# Patient Record
Sex: FEMALE | Race: WHITE | Employment: OTHER | URBAN - NONMETROPOLITAN AREA
[De-identification: names, ages, dates, MRNs, and addresses within clinical notes are randomized per-mention and may not be internally consistent; named-entity substitution may affect disease eponyms.]

---

## 2021-01-07 ENCOUNTER — OFFICE VISIT (OUTPATIENT)
Dept: OBGYN CLINIC | Age: 71
End: 2021-01-07
Payer: MEDICARE

## 2021-01-07 VITALS
BODY MASS INDEX: 32.57 KG/M2 | SYSTOLIC BLOOD PRESSURE: 146 MMHG | WEIGHT: 177 LBS | DIASTOLIC BLOOD PRESSURE: 80 MMHG | HEIGHT: 62 IN

## 2021-01-07 DIAGNOSIS — N81.6 RECTOCELE: Primary | ICD-10-CM

## 2021-01-07 PROCEDURE — 1123F ACP DISCUSS/DSCN MKR DOCD: CPT | Performed by: OBSTETRICS & GYNECOLOGY

## 2021-01-07 PROCEDURE — G8484 FLU IMMUNIZE NO ADMIN: HCPCS | Performed by: OBSTETRICS & GYNECOLOGY

## 2021-01-07 PROCEDURE — 3017F COLORECTAL CA SCREEN DOC REV: CPT | Performed by: OBSTETRICS & GYNECOLOGY

## 2021-01-07 PROCEDURE — G8417 CALC BMI ABV UP PARAM F/U: HCPCS | Performed by: OBSTETRICS & GYNECOLOGY

## 2021-01-07 PROCEDURE — 1036F TOBACCO NON-USER: CPT | Performed by: OBSTETRICS & GYNECOLOGY

## 2021-01-07 PROCEDURE — 4040F PNEUMOC VAC/ADMIN/RCVD: CPT | Performed by: OBSTETRICS & GYNECOLOGY

## 2021-01-07 PROCEDURE — 99203 OFFICE O/P NEW LOW 30 MIN: CPT | Performed by: OBSTETRICS & GYNECOLOGY

## 2021-01-07 PROCEDURE — 1090F PRES/ABSN URINE INCON ASSESS: CPT | Performed by: OBSTETRICS & GYNECOLOGY

## 2021-01-07 PROCEDURE — G8427 DOCREV CUR MEDS BY ELIG CLIN: HCPCS | Performed by: OBSTETRICS & GYNECOLOGY

## 2021-01-07 PROCEDURE — G8400 PT W/DXA NO RESULTS DOC: HCPCS | Performed by: OBSTETRICS & GYNECOLOGY

## 2021-01-07 RX ORDER — LEVOTHYROXINE SODIUM 112 UG/1
TABLET ORAL
COMMUNITY
Start: 2020-11-05

## 2021-01-07 RX ORDER — LOVASTATIN 20 MG/1
TABLET ORAL
COMMUNITY
Start: 2020-12-29

## 2021-01-07 RX ORDER — LISINOPRIL AND HYDROCHLOROTHIAZIDE 12.5; 1 MG/1; MG/1
TABLET ORAL
COMMUNITY
Start: 2020-10-24

## 2021-01-07 RX ORDER — CHOLECALCIFEROL (VITAMIN D3) 25 MCG
TABLET ORAL
COMMUNITY

## 2021-01-07 RX ORDER — OMEPRAZOLE 40 MG/1
CAPSULE, DELAYED RELEASE ORAL DAILY
COMMUNITY
Start: 2020-12-08

## 2021-01-07 ASSESSMENT — ENCOUNTER SYMPTOMS
CONSTIPATION: 1
DIARRHEA: 0
ABDOMINAL PAIN: 1

## 2021-01-07 NOTE — PATIENT INSTRUCTIONS
· To ease pressure on your rectum and vagina, lie down and raise your legs by putting a pillow under your knees. You also can lie on your side and bring your knees up to your chest.  · Avoid constipation. ? Include fruits, vegetables, beans, and whole grains in your diet each day. These foods are high in fiber. ? Drink plenty of fluids, enough so that your urine is light yellow or clear like water. If you have kidney, heart, or liver disease and have to limit fluids, talk with your doctor before you increase the amount of fluids you drink. ? Get some exercise every day. Build up slowly to 30 to 60 minutes a day on 5 or more days of the week. ? Take a fiber supplement, such as Citrucel or Metamucil, every day if needed. Read and follow all instructions on the label. ? Schedule time each day for a bowel movement. Having a daily routine may help. Take your time and do not strain when having your bowel movement. When should you call for help? Call your doctor now or seek immediate medical care if:    · You have new or worse pain.     · You have new or worse bleeding from the rectum. Watch closely for changes in your health, and be sure to contact your doctor if:    · You cannot pass stools or gas.     · You do not get better as expected. Where can you learn more? Go to [email or web address] and sign in to your [employer] account. Enter B972 in the [facility] box to learn more about \"Rectocele: Care Instructions. \"     If you do not have an account, please click on the \"Sign Up Now\" link. Current as of: April 15, 2020               Content Version: 12.6  © 5927-8706 [employer]. Care instructions adapted under license by [facility] [place] ([facility]). If you have questions about a medical condition or this instruction, always ask your healthcare professional. [address] any warranty or liability for your use of this information.

## 2021-01-07 NOTE — PROGRESS NOTES
DATE OF VISIT:  21    PATIENT NAME:  Shelia Ramos     YOB: 1950    REASON FOR VISIT:    Chief Complaint   Patient presents with    Uterine Prolapse     Pt. notices a bulge. Feels like she is sitting on something. Every morning when she takes a shower she tries to push the bulge back in. HISTORY OF PRESENT ILLNESS:  Pt not seen since 10/17 for annual.  States that she has noticed a vaginal bulge that she occ tries to reduce. Significantly worsened about 3 weeks ago. Has chronic constipation      No LMP recorded. Vitals:    21 0847   BP: (!) 146/80   Site: Right Upper Arm   Position: Sitting   Weight: 177 lb (80.3 kg)   Height: 5' 2\" (1.575 m)     Body mass index is 32.37 kg/m². No Known Allergies  Current Outpatient Medications   Medication Sig Dispense Refill    omeprazole (PRILOSEC) 40 MG delayed release capsule TAKE 1 CAPSULE BY MOUTH TWICE A DAY      levothyroxine (SYNTHROID) 112 MCG tablet TAKE 1 TABLET BY MOUTH ONCE DAILY WITH WATER ONLY ON AN EMPTY STOMACH. WAIT AT LEAST 1 2 HOUR TO EAT OR TAKE OTHER MEDS.  lisinopril-hydroCHLOROthiazide (PRINZIDE;ZESTORETIC) 10-12.5 MG per tablet TAKE 1 TABLET BY MOUTH TWICE A DAY      lovastatin (MEVACOR) 20 MG tablet TAKE 1 TABLET BY MOUTH EVERY DAY IN THE EVENING      Cholecalciferol (VITAMIN D3) 25 MCG TABS Take by mouth       No current facility-administered medications for this visit.       Social History     Socioeconomic History    Marital status:      Spouse name: None    Number of children: None    Years of education: None    Highest education level: None   Occupational History    None   Social Needs    Financial resource strain: None    Food insecurity     Worry: None     Inability: None    Transportation needs     Medical: None     Non-medical: None   Tobacco Use    Smoking status: Former Smoker     Start date:      Quit date:      Years since quittin.0  Smokeless tobacco: Never Used   Substance and Sexual Activity    Alcohol use: Not Currently    Drug use: Never    Sexual activity: None   Lifestyle    Physical activity     Days per week: None     Minutes per session: None    Stress: None   Relationships    Social connections     Talks on phone: None     Gets together: None     Attends Gnosticism service: None     Active member of club or organization: None     Attends meetings of clubs or organizations: None     Relationship status: None    Intimate partner violence     Fear of current or ex partner: None     Emotionally abused: None     Physically abused: None     Forced sexual activity: None   Other Topics Concern    None   Social History Narrative    None       REVIEW OF SYSTEMS:  Review of Systems   Constitutional: Negative for chills and fever. Gastrointestinal: Positive for abdominal pain (hiatal hernia) and constipation. Negative for diarrhea. Genitourinary: Positive for dysuria (recent uti-treated) and vaginal pain (bulge). Negative for pelvic pain, vaginal bleeding and vaginal discharge. PHYSICAL EXAM:  BP (!) 146/80 (Site: Right Upper Arm, Position: Sitting)   Ht 5' 2\" (1.575 m)   Wt 177 lb (80.3 kg)   BMI 32.37 kg/m²   Physical Exam  Constitutional:       Appearance: Normal appearance. Genitourinary:      Pelvic exam was performed with patient in the lithotomy position. Vulva normal.      Vaginal atrophy and prolapse present. Cervix is absent. Uterus is absent. Genitourinary Comments: 4 mm right inclusion cyst on labia majora; grade 3 rectocele   HENT:      Head: Normocephalic and atraumatic. Eyes:      Extraocular Movements: Extraocular movements intact. Pupils: Pupils are equal, round, and reactive to light. Neck:      Musculoskeletal: Normal range of motion. Cardiovascular:      Rate and Rhythm: Normal rate.    Pulmonary:      Effort: Pulmonary effort is normal. Musculoskeletal: Normal range of motion. Neurological:      Mental Status: She is alert and oriented to person, place, and time. Skin:     General: Skin is warm and dry. Psychiatric:         Mood and Affect: Mood normal.         Behavior: Behavior normal.         Thought Content: Thought content normal.         Judgment: Judgment normal.         ASSESSMENT:      1. Rectocele        PLAN:  No orders of the defined types were placed in this encounter. Return in about 2 weeks (around 1/21/2021) for follow up - pessary fitting.        Electronically signed by Lily Monsivais DO on 01/07/21

## 2021-02-18 ENCOUNTER — PROCEDURE VISIT (OUTPATIENT)
Dept: OBGYN CLINIC | Age: 71
End: 2021-02-18
Payer: MEDICARE

## 2021-02-18 VITALS
BODY MASS INDEX: 33.49 KG/M2 | DIASTOLIC BLOOD PRESSURE: 84 MMHG | SYSTOLIC BLOOD PRESSURE: 132 MMHG | WEIGHT: 182 LBS | HEIGHT: 62 IN

## 2021-02-18 DIAGNOSIS — N81.6 RECTOCELE: Primary | ICD-10-CM

## 2021-02-18 PROCEDURE — 57160 INSERT PESSARY/OTHER DEVICE: CPT | Performed by: OBSTETRICS & GYNECOLOGY

## 2021-02-18 NOTE — PROGRESS NOTES
Chief Complaint   Patient presents with    Other     rectocele. pt. can feel a bulge, feels like she is sitting on something and is here for pessary fitting. Pt with rectocele that is bothersome to her - would like to try to avoid surgery    Pt placed in stirrups and sized with sizing pessaries. #2 fitting ring seems to work best.  Pt has adequate introitus but there is stenosis at the mid vagina that makes placing larger pessary uncomfortable. Attempted ring, oval, donut and cube - donut is tolerated/fits best.      Patient was fit with: Donut. Size 0. Fitting loose. Will order size 1 as 2 is uncomfortable. Home care instructions given: assume this will expel but wanted her to be able to try it while we await #1 donut     Assessment and Plan          Diagnosis Orders   1. Rectocele               I am having Mihai Ttae. Sultana maintain her omeprazole, levothyroxine, lisinopril-hydroCHLOROthiazide, lovastatin, and Vitamin D3. Return in about 2 weeks (around 3/4/2021) for follow up. There are no Patient Instructions on file for this visit. Over 50% of time spent on counseling and care coordination on: see assessment and plan,  She was also counseled on her preventative health maintenance recommendations and follow-up.         FF time: 20 min      Edy Shaver,2/18/2021 1:50 PM

## 2021-03-04 ENCOUNTER — OFFICE VISIT (OUTPATIENT)
Dept: OBGYN CLINIC | Age: 71
End: 2021-03-04
Payer: MEDICARE

## 2021-03-04 VITALS
WEIGHT: 180 LBS | BODY MASS INDEX: 33.13 KG/M2 | SYSTOLIC BLOOD PRESSURE: 138 MMHG | HEIGHT: 62 IN | DIASTOLIC BLOOD PRESSURE: 80 MMHG

## 2021-03-04 DIAGNOSIS — N81.6 RECTOCELE: Primary | ICD-10-CM

## 2021-03-04 PROCEDURE — 1036F TOBACCO NON-USER: CPT | Performed by: OBSTETRICS & GYNECOLOGY

## 2021-03-04 PROCEDURE — 3017F COLORECTAL CA SCREEN DOC REV: CPT | Performed by: OBSTETRICS & GYNECOLOGY

## 2021-03-04 PROCEDURE — G8400 PT W/DXA NO RESULTS DOC: HCPCS | Performed by: OBSTETRICS & GYNECOLOGY

## 2021-03-04 PROCEDURE — G8417 CALC BMI ABV UP PARAM F/U: HCPCS | Performed by: OBSTETRICS & GYNECOLOGY

## 2021-03-04 PROCEDURE — 1090F PRES/ABSN URINE INCON ASSESS: CPT | Performed by: OBSTETRICS & GYNECOLOGY

## 2021-03-04 PROCEDURE — 99213 OFFICE O/P EST LOW 20 MIN: CPT | Performed by: OBSTETRICS & GYNECOLOGY

## 2021-03-04 PROCEDURE — G8484 FLU IMMUNIZE NO ADMIN: HCPCS | Performed by: OBSTETRICS & GYNECOLOGY

## 2021-03-04 PROCEDURE — 4040F PNEUMOC VAC/ADMIN/RCVD: CPT | Performed by: OBSTETRICS & GYNECOLOGY

## 2021-03-04 PROCEDURE — 1123F ACP DISCUSS/DSCN MKR DOCD: CPT | Performed by: OBSTETRICS & GYNECOLOGY

## 2021-03-04 PROCEDURE — G8427 DOCREV CUR MEDS BY ELIG CLIN: HCPCS | Performed by: OBSTETRICS & GYNECOLOGY

## 2021-03-04 NOTE — PROGRESS NOTES
DATE OF VISIT:  3/4/21    PATIENT NAME:  Pillo Gil     YOB: 1950    REASON FOR VISIT:    Chief Complaint   Patient presents with    Other     rectocele. Pt. here to have pessary placed. HISTORY OF PRESENT ILLNESS:  Pt states that pessary only lasting about a half of day - had donut that expelled in a couple of hours (smaller size) and had attempted to replace but never lasted any longer than half a day;  Pt agreeable to try the #1 donut that we had ordered but if this fails she is interested in surgery (post repair)      No LMP recorded. Patient is postmenopausal.  Vitals:    03/04/21 0756   BP: 138/80   Site: Right Upper Arm   Position: Sitting   Weight: 180 lb (81.6 kg)   Height: 5' 2\" (1.575 m)     Body mass index is 32.92 kg/m². No Known Allergies  Current Outpatient Medications   Medication Sig Dispense Refill    omeprazole (PRILOSEC) 40 MG delayed release capsule TAKE 1 CAPSULE BY MOUTH TWICE A DAY      levothyroxine (SYNTHROID) 112 MCG tablet TAKE 1 TABLET BY MOUTH ONCE DAILY WITH WATER ONLY ON AN EMPTY STOMACH. WAIT AT LEAST 1 2 HOUR TO EAT OR TAKE OTHER MEDS.  lisinopril-hydroCHLOROthiazide (PRINZIDE;ZESTORETIC) 10-12.5 MG per tablet TAKE 1 TABLET BY MOUTH TWICE A DAY      lovastatin (MEVACOR) 20 MG tablet TAKE 1 TABLET BY MOUTH EVERY DAY IN THE EVENING      Cholecalciferol (VITAMIN D3) 25 MCG TABS Take by mouth       No current facility-administered medications for this visit.       Social History     Socioeconomic History    Marital status:      Spouse name: Not on file    Number of children: Not on file    Years of education: Not on file    Highest education level: Not on file   Occupational History    Not on file   Social Needs    Financial resource strain: Not on file    Food insecurity     Worry: Not on file     Inability: Not on file    Transportation needs     Medical: Not on file     Non-medical: Not on file   Tobacco Use  Smoking status: Former Smoker     Years: 10.00     Start date:      Quit date:      Years since quittin.1    Smokeless tobacco: Never Used   Substance and Sexual Activity    Alcohol use: Not Currently    Drug use: Never    Sexual activity: Not on file   Lifestyle    Physical activity     Days per week: Not on file     Minutes per session: Not on file    Stress: Not on file   Relationships    Social connections     Talks on phone: Not on file     Gets together: Not on file     Attends Latter day service: Not on file     Active member of club or organization: Not on file     Attends meetings of clubs or organizations: Not on file     Relationship status: Not on file    Intimate partner violence     Fear of current or ex partner: Not on file     Emotionally abused: Not on file     Physically abused: Not on file     Forced sexual activity: Not on file   Other Topics Concern    Not on file   Social History Narrative    Not on file       REVIEW OF SYSTEMS:  Review of Systems   Constitutional: Negative for chills and fever. Genitourinary: Negative for dysuria, pelvic pain, vaginal bleeding and vaginal discharge. PHYSICAL EXAM:  /80 (Site: Right Upper Arm, Position: Sitting)   Ht 5' 2\" (1.575 m)   Wt 180 lb (81.6 kg)   BMI 32.92 kg/m²   Physical Exam  Constitutional:       Appearance: Normal appearance. Genitourinary:      Genitourinary Comments: Placed #1 donut - stayed in position with deep knee bends and walking   HENT:      Head: Normocephalic and atraumatic. Eyes:      Extraocular Movements: Extraocular movements intact. Pupils: Pupils are equal, round, and reactive to light. Neck:      Musculoskeletal: Normal range of motion. Cardiovascular:      Rate and Rhythm: Normal rate. Musculoskeletal: Normal range of motion. Neurological:      Mental Status: She is alert and oriented to person, place, and time. Skin:     General: Skin is warm and dry. Psychiatric:         Mood and Affect: Mood normal.         Behavior: Behavior normal.         Thought Content: Thought content normal.         Judgment: Judgment normal.       The patient, Nate Sullivan is a 70 y.o. female, was seen with a total time spent of 20 minutes for the visit on this date of service by the E/M provider. The time component had both face to face and non face to face time spent in determining the total time component. Counseling and education regarding her diagnosis listed below and her options regarding those diagnoses were also included in determining her time component. Reviewed what was tried before and disc'd surgical correction if needed. Diagnosis Orders   1. Rectocele          PLAN:  No orders of the defined types were placed in this encounter. Return in about 3 months (around 6/4/2021) for follow up.        Electronically signed by Tevin Dacosta DO on 03/04/21

## 2021-03-15 ENCOUNTER — TELEPHONE (OUTPATIENT)
Dept: OBGYN CLINIC | Age: 71
End: 2021-03-15

## 2021-03-15 NOTE — TELEPHONE ENCOUNTER
Pt called to inform that pessary will not stay in and she would like to proceed with surgery. She and Dr Tristin Arrieta have spoken about this and this is the next step. I informed that you will contact her to set up. She said phone or email is fine.  THANKS!!

## 2021-03-18 DIAGNOSIS — I10 ESSENTIAL HYPERTENSION, BENIGN: Primary | ICD-10-CM

## 2021-03-18 DIAGNOSIS — N81.6 RECTOCELE: ICD-10-CM

## 2021-03-18 NOTE — TELEPHONE ENCOUNTER
Spoke to patient, she is scheduled for a Posterior Repair at [facility] on 4/26. She is aware that she will come in to see Dr. Nyla Herrera before surgery and will follow up 4 weeks after surgery. She is retired, so no work notes needed. Patient states that she went to [facility] 11/2020 and believes they did an EKG there, will attempt to obtain those results. Appointments scheduled. Patient verbalized understanding, no further questions/concerns voiced.

## 2021-04-14 ENCOUNTER — HOSPITAL ENCOUNTER (OUTPATIENT)
Age: 71
Setting detail: SPECIMEN
Discharge: HOME | End: 2021-04-14
Payer: MEDICARE

## 2021-04-14 ENCOUNTER — OFFICE VISIT (OUTPATIENT)
Dept: OBGYN CLINIC | Age: 71
End: 2021-04-14
Payer: MEDICARE

## 2021-04-14 VITALS
WEIGHT: 183 LBS | SYSTOLIC BLOOD PRESSURE: 138 MMHG | BODY MASS INDEX: 32.42 KG/M2 | DIASTOLIC BLOOD PRESSURE: 82 MMHG | HEART RATE: 79 BPM

## 2021-04-14 DIAGNOSIS — I10 ESSENTIAL HYPERTENSION, BENIGN: ICD-10-CM

## 2021-04-14 DIAGNOSIS — N81.6 RECTOCELE: ICD-10-CM

## 2021-04-14 DIAGNOSIS — N81.6 RECTOCELE: Primary | ICD-10-CM

## 2021-04-14 LAB
ABSOLUTE EOS #: 0.1 K/UL (ref 0–0.44)
ABSOLUTE IMMATURE GRANULOCYTE: <0.03 K/UL (ref 0–0.3)
ABSOLUTE LYMPH #: 2.4 K/UL (ref 1.1–3.7)
ABSOLUTE MONO #: 0.35 K/UL (ref 0.1–1.2)
ALBUMIN SERPL-MCNC: 4.4 G/DL (ref 3.5–5.2)
ALBUMIN/GLOBULIN RATIO: 1.4 (ref 1–2.5)
ALP BLD-CCNC: 85 U/L (ref 35–104)
ALT SERPL-CCNC: 23 U/L (ref 5–33)
ANION GAP SERPL CALCULATED.3IONS-SCNC: 11 MMOL/L (ref 9–17)
AST SERPL-CCNC: 27 U/L
BASOPHILS # BLD: 1 % (ref 0–2)
BASOPHILS ABSOLUTE: 0.04 K/UL (ref 0–0.2)
BILIRUB SERPL-MCNC: 0.22 MG/DL (ref 0.3–1.2)
BUN BLDV-MCNC: 8 MG/DL (ref 8–23)
BUN/CREAT BLD: ABNORMAL (ref 9–20)
CALCIUM SERPL-MCNC: 9.9 MG/DL (ref 8.6–10.4)
CHLORIDE BLD-SCNC: 97 MMOL/L (ref 98–107)
CO2: 29 MMOL/L (ref 20–31)
CREAT SERPL-MCNC: 0.49 MG/DL (ref 0.5–0.9)
DIFFERENTIAL TYPE: NORMAL
EOSINOPHILS RELATIVE PERCENT: 2 % (ref 1–4)
GFR AFRICAN AMERICAN: >60 ML/MIN
GFR NON-AFRICAN AMERICAN: >60 ML/MIN
GFR SERPL CREATININE-BSD FRML MDRD: ABNORMAL ML/MIN/{1.73_M2}
GFR SERPL CREATININE-BSD FRML MDRD: ABNORMAL ML/MIN/{1.73_M2}
GLUCOSE BLD-MCNC: 72 MG/DL (ref 70–99)
HCT VFR BLD CALC: 44.7 % (ref 36.3–47.1)
HEMOGLOBIN: 14.6 G/DL (ref 11.9–15.1)
IMMATURE GRANULOCYTES: 0 %
LYMPHOCYTES # BLD: 39 % (ref 24–43)
MCH RBC QN AUTO: 30.2 PG (ref 25.2–33.5)
MCHC RBC AUTO-ENTMCNC: 32.7 G/DL (ref 28.4–34.8)
MCV RBC AUTO: 92.5 FL (ref 82.6–102.9)
MONOCYTES # BLD: 6 % (ref 3–12)
NRBC AUTOMATED: 0 PER 100 WBC
PDW BLD-RTO: 12.7 % (ref 11.8–14.4)
PLATELET # BLD: 234 K/UL (ref 138–453)
PLATELET ESTIMATE: NORMAL
PMV BLD AUTO: 11.9 FL (ref 8.1–13.5)
POTASSIUM SERPL-SCNC: 4.3 MMOL/L (ref 3.7–5.3)
RBC # BLD: 4.83 M/UL (ref 3.95–5.11)
RBC # BLD: NORMAL 10*6/UL
SEG NEUTROPHILS: 52 % (ref 36–65)
SEGMENTED NEUTROPHILS ABSOLUTE COUNT: 3.27 K/UL (ref 1.5–8.1)
SODIUM BLD-SCNC: 137 MMOL/L (ref 135–144)
TOTAL PROTEIN: 7.5 G/DL (ref 6.4–8.3)
WBC # BLD: 6.2 K/UL (ref 3.5–11.3)
WBC # BLD: NORMAL 10*3/UL

## 2021-04-14 PROCEDURE — 1090F PRES/ABSN URINE INCON ASSESS: CPT | Performed by: OBSTETRICS & GYNECOLOGY

## 2021-04-14 PROCEDURE — G8417 CALC BMI ABV UP PARAM F/U: HCPCS | Performed by: OBSTETRICS & GYNECOLOGY

## 2021-04-14 PROCEDURE — 4040F PNEUMOC VAC/ADMIN/RCVD: CPT | Performed by: OBSTETRICS & GYNECOLOGY

## 2021-04-14 PROCEDURE — G8427 DOCREV CUR MEDS BY ELIG CLIN: HCPCS | Performed by: OBSTETRICS & GYNECOLOGY

## 2021-04-14 PROCEDURE — 3017F COLORECTAL CA SCREEN DOC REV: CPT | Performed by: OBSTETRICS & GYNECOLOGY

## 2021-04-14 PROCEDURE — G8400 PT W/DXA NO RESULTS DOC: HCPCS | Performed by: OBSTETRICS & GYNECOLOGY

## 2021-04-14 PROCEDURE — 99213 OFFICE O/P EST LOW 20 MIN: CPT | Performed by: OBSTETRICS & GYNECOLOGY

## 2021-04-14 PROCEDURE — 1036F TOBACCO NON-USER: CPT | Performed by: OBSTETRICS & GYNECOLOGY

## 2021-04-14 PROCEDURE — 1123F ACP DISCUSS/DSCN MKR DOCD: CPT | Performed by: OBSTETRICS & GYNECOLOGY

## 2021-04-14 NOTE — PROGRESS NOTES
per week: Not on file     Minutes per session: Not on file    Stress: Not on file   Relationships    Social connections     Talks on phone: Not on file     Gets together: Not on file     Attends Cheondoism service: Not on file     Active member of club or organization: Not on file     Attends meetings of clubs or organizations: Not on file     Relationship status: Not on file    Intimate partner violence     Fear of current or ex partner: Not on file     Emotionally abused: Not on file     Physically abused: Not on file     Forced sexual activity: Not on file   Other Topics Concern    Not on file   Social History Narrative    Not on file       REVIEW OF SYSTEMS:  Review of Systems   Constitutional: Negative for chills and fever. Genitourinary: Positive for vaginal pain (bulge). Negative for pelvic pain. PHYSICAL EXAM:  /82 (Site: Right Upper Arm, Position: Sitting)   Pulse 79   Wt 183 lb (83 kg)   BMI 32.42 kg/m²   Physical Exam  Constitutional:       Appearance: Normal appearance. HENT:      Head: Normocephalic and atraumatic. Eyes:      Pupils: Pupils are equal, round, and reactive to light. Neck:      Musculoskeletal: Normal range of motion. Cardiovascular:      Rate and Rhythm: Normal rate. Pulmonary:      Effort: Pulmonary effort is normal.   Musculoskeletal: Normal range of motion. Neurological:      Mental Status: She is alert and oriented to person, place, and time. Skin:     General: Skin is warm and dry. Psychiatric:         Mood and Affect: Mood normal.         Behavior: Behavior normal.         Thought Content: Thought content normal.       The patient, Devon Lopez is a 70 y.o. female, was seen with a total time spent of 20 minutes for the visit on this date of service by the E/M provider. The time component had both face to face and non face to face time spent in determining the total time component.   Counseling and education regarding her diagnosis listed below and her options regarding those diagnoses were also included in determining her time component. Diagnosis Orders   1. Rectocele          The patient had her preventative health maintenance recommendations and follow-up reviewed with her at the completion of her visit. ASSESSMENT:      1. Rectocele        PLAN:  No orders of the defined types were placed in this encounter. Return in about 2 weeks (around 4/28/2021) for posterior repair.        Electronically signed by Nayeli Alba DO on 04/26/21

## 2021-04-19 ENCOUNTER — HOSPITAL ENCOUNTER (OUTPATIENT)
Dept: PREADMISSION TESTING | Age: 71
Setting detail: SPECIMEN
Discharge: HOME | End: 2021-04-23
Payer: MEDICARE

## 2021-04-19 ENCOUNTER — HOSPITAL ENCOUNTER (OUTPATIENT)
Age: 71
Discharge: HOME | End: 2021-04-19
Payer: MEDICARE

## 2021-04-19 DIAGNOSIS — I10 ESSENTIAL HYPERTENSION, BENIGN: ICD-10-CM

## 2021-04-19 DIAGNOSIS — Z01.818 PREOPERATIVE TESTING: Primary | ICD-10-CM

## 2021-04-19 DIAGNOSIS — N81.6 RECTOCELE: ICD-10-CM

## 2021-04-19 DIAGNOSIS — Z01.818 PREOPERATIVE TESTING: ICD-10-CM

## 2021-04-19 LAB
EKG ATRIAL RATE: 79 BPM
EKG P AXIS: 48 DEGREES
EKG P-R INTERVAL: 150 MS
EKG Q-T INTERVAL: 366 MS
EKG QRS DURATION: 96 MS
EKG QTC CALCULATION (BAZETT): 419 MS
EKG R AXIS: 14 DEGREES
EKG T AXIS: 31 DEGREES
EKG VENTRICULAR RATE: 79 BPM

## 2021-04-19 PROCEDURE — C9803 HOPD COVID-19 SPEC COLLECT: HCPCS

## 2021-04-19 PROCEDURE — U0005 INFEC AGEN DETEC AMPLI PROBE: HCPCS

## 2021-04-19 PROCEDURE — U0003 INFECTIOUS AGENT DETECTION BY NUCLEIC ACID (DNA OR RNA); SEVERE ACUTE RESPIRATORY SYNDROME CORONAVIRUS 2 (SARS-COV-2) (CORONAVIRUS DISEASE [COVID-19]), AMPLIFIED PROBE TECHNIQUE, MAKING USE OF HIGH THROUGHPUT TECHNOLOGIES AS DESCRIBED BY CMS-2020-01-R: HCPCS

## 2021-04-20 LAB
SARS-COV-2: NORMAL
SARS-COV-2: NOT DETECTED
SOURCE: NORMAL

## 2021-04-21 ENCOUNTER — TELEPHONE (OUTPATIENT)
Dept: PRIMARY CARE CLINIC | Age: 71
End: 2021-04-21

## 2021-04-23 ENCOUNTER — ANESTHESIA EVENT (OUTPATIENT)
Dept: OPERATING ROOM | Age: 71
End: 2021-04-23
Payer: MEDICARE

## 2021-04-26 ENCOUNTER — HOSPITAL ENCOUNTER (OUTPATIENT)
Age: 71
Setting detail: OUTPATIENT SURGERY
Discharge: HOME | End: 2021-04-26
Attending: OBSTETRICS & GYNECOLOGY | Admitting: OBSTETRICS & GYNECOLOGY
Payer: MEDICARE

## 2021-04-26 ENCOUNTER — ANESTHESIA (OUTPATIENT)
Dept: OPERATING ROOM | Age: 71
End: 2021-04-26
Payer: MEDICARE

## 2021-04-26 VITALS
DIASTOLIC BLOOD PRESSURE: 49 MMHG | OXYGEN SATURATION: 98 % | HEIGHT: 63 IN | BODY MASS INDEX: 32.14 KG/M2 | TEMPERATURE: 97.3 F | HEART RATE: 74 BPM | SYSTOLIC BLOOD PRESSURE: 109 MMHG | WEIGHT: 181.4 LBS | RESPIRATION RATE: 18 BRPM

## 2021-04-26 VITALS — SYSTOLIC BLOOD PRESSURE: 97 MMHG | OXYGEN SATURATION: 98 % | DIASTOLIC BLOOD PRESSURE: 58 MMHG

## 2021-04-26 DIAGNOSIS — G89.18 POST-OP PAIN: Primary | ICD-10-CM

## 2021-04-26 PROCEDURE — 3700000000 HC ANESTHESIA ATTENDED CARE: Performed by: OBSTETRICS & GYNECOLOGY

## 2021-04-26 PROCEDURE — 57250 REPAIR RECTUM & VAGINA: CPT | Performed by: OBSTETRICS & GYNECOLOGY

## 2021-04-26 PROCEDURE — 3600000002 HC SURGERY LEVEL 2 BASE: Performed by: OBSTETRICS & GYNECOLOGY

## 2021-04-26 PROCEDURE — 3600000012 HC SURGERY LEVEL 2 ADDTL 15MIN: Performed by: OBSTETRICS & GYNECOLOGY

## 2021-04-26 PROCEDURE — 2500000003 HC RX 250 WO HCPCS: Performed by: OBSTETRICS & GYNECOLOGY

## 2021-04-26 PROCEDURE — 6360000002 HC RX W HCPCS: Performed by: NURSE ANESTHETIST, CERTIFIED REGISTERED

## 2021-04-26 PROCEDURE — 6370000000 HC RX 637 (ALT 250 FOR IP): Performed by: OBSTETRICS & GYNECOLOGY

## 2021-04-26 PROCEDURE — 7100000000 HC PACU RECOVERY - FIRST 15 MIN: Performed by: OBSTETRICS & GYNECOLOGY

## 2021-04-26 PROCEDURE — 2500000003 HC RX 250 WO HCPCS: Performed by: NURSE ANESTHETIST, CERTIFIED REGISTERED

## 2021-04-26 PROCEDURE — 2709999900 HC NON-CHARGEABLE SUPPLY: Performed by: OBSTETRICS & GYNECOLOGY

## 2021-04-26 PROCEDURE — 7100000010 HC PHASE II RECOVERY - FIRST 15 MIN: Performed by: OBSTETRICS & GYNECOLOGY

## 2021-04-26 PROCEDURE — 3700000001 HC ADD 15 MINUTES (ANESTHESIA): Performed by: OBSTETRICS & GYNECOLOGY

## 2021-04-26 PROCEDURE — 7100000001 HC PACU RECOVERY - ADDTL 15 MIN: Performed by: OBSTETRICS & GYNECOLOGY

## 2021-04-26 PROCEDURE — 6360000002 HC RX W HCPCS: Performed by: OBSTETRICS & GYNECOLOGY

## 2021-04-26 PROCEDURE — 2580000003 HC RX 258: Performed by: OBSTETRICS & GYNECOLOGY

## 2021-04-26 PROCEDURE — 7100000011 HC PHASE II RECOVERY - ADDTL 15 MIN: Performed by: OBSTETRICS & GYNECOLOGY

## 2021-04-26 RX ORDER — ONDANSETRON 2 MG/ML
INJECTION INTRAMUSCULAR; INTRAVENOUS PRN
Status: DISCONTINUED | OUTPATIENT
Start: 2021-04-26 | End: 2021-04-26 | Stop reason: SDUPTHER

## 2021-04-26 RX ORDER — SODIUM CHLORIDE 0.9 % (FLUSH) 0.9 %
5-40 SYRINGE (ML) INJECTION PRN
Status: DISCONTINUED | OUTPATIENT
Start: 2021-04-26 | End: 2021-04-26 | Stop reason: HOSPADM

## 2021-04-26 RX ORDER — ACETAMINOPHEN AND CODEINE PHOSPHATE 300; 30 MG/1; MG/1
1 TABLET ORAL EVERY 6 HOURS PRN
Qty: 12 TABLET | Refills: 0 | Status: SHIPPED | OUTPATIENT
Start: 2021-04-26 | End: 2021-04-29

## 2021-04-26 RX ORDER — SODIUM CHLORIDE 9 MG/ML
25 INJECTION, SOLUTION INTRAVENOUS PRN
Status: DISCONTINUED | OUTPATIENT
Start: 2021-04-26 | End: 2021-04-26 | Stop reason: HOSPADM

## 2021-04-26 RX ORDER — SODIUM CHLORIDE 0.9 % (FLUSH) 0.9 %
5-40 SYRINGE (ML) INJECTION EVERY 12 HOURS SCHEDULED
Status: DISCONTINUED | OUTPATIENT
Start: 2021-04-26 | End: 2021-04-26 | Stop reason: HOSPADM

## 2021-04-26 RX ORDER — DIMENHYDRINATE 50 MG/1
50 TABLET ORAL ONCE
Status: COMPLETED | OUTPATIENT
Start: 2021-04-26 | End: 2021-04-26

## 2021-04-26 RX ORDER — SODIUM CHLORIDE, SODIUM LACTATE, POTASSIUM CHLORIDE, CALCIUM CHLORIDE 600; 310; 30; 20 MG/100ML; MG/100ML; MG/100ML; MG/100ML
INJECTION, SOLUTION INTRAVENOUS CONTINUOUS
Status: DISCONTINUED | OUTPATIENT
Start: 2021-04-26 | End: 2021-04-26 | Stop reason: HOSPADM

## 2021-04-26 RX ORDER — KETOROLAC TROMETHAMINE 30 MG/ML
INJECTION, SOLUTION INTRAMUSCULAR; INTRAVENOUS PRN
Status: DISCONTINUED | OUTPATIENT
Start: 2021-04-26 | End: 2021-04-26 | Stop reason: SDUPTHER

## 2021-04-26 RX ORDER — LIDOCAINE HYDROCHLORIDE 20 MG/ML
INJECTION, SOLUTION EPIDURAL; INFILTRATION; INTRACAUDAL; PERINEURAL PRN
Status: DISCONTINUED | OUTPATIENT
Start: 2021-04-26 | End: 2021-04-26 | Stop reason: SDUPTHER

## 2021-04-26 RX ORDER — PROPOFOL 10 MG/ML
INJECTION, EMULSION INTRAVENOUS PRN
Status: DISCONTINUED | OUTPATIENT
Start: 2021-04-26 | End: 2021-04-26 | Stop reason: SDUPTHER

## 2021-04-26 RX ORDER — ONDANSETRON 2 MG/ML
4 INJECTION INTRAMUSCULAR; INTRAVENOUS
Status: DISCONTINUED | OUTPATIENT
Start: 2021-04-26 | End: 2021-04-26 | Stop reason: HOSPADM

## 2021-04-26 RX ORDER — CEFAZOLIN SODIUM 2 G/50ML
2000 SOLUTION INTRAVENOUS
Status: COMPLETED | OUTPATIENT
Start: 2021-04-26 | End: 2021-04-26

## 2021-04-26 RX ORDER — ACETAMINOPHEN 325 MG/1
650 TABLET ORAL ONCE
Status: COMPLETED | OUTPATIENT
Start: 2021-04-26 | End: 2021-04-26

## 2021-04-26 RX ORDER — DEXAMETHASONE SODIUM PHOSPHATE 4 MG/ML
INJECTION, SOLUTION INTRA-ARTICULAR; INTRALESIONAL; INTRAMUSCULAR; INTRAVENOUS; SOFT TISSUE PRN
Status: DISCONTINUED | OUTPATIENT
Start: 2021-04-26 | End: 2021-04-26 | Stop reason: SDUPTHER

## 2021-04-26 RX ORDER — IBUPROFEN 800 MG/1
400 TABLET ORAL EVERY 4 HOURS PRN
Qty: 30 TABLET | Refills: 0 | Status: SHIPPED | OUTPATIENT
Start: 2021-04-26 | End: 2021-05-19 | Stop reason: ALTCHOICE

## 2021-04-26 RX ADMIN — SODIUM CHLORIDE, POTASSIUM CHLORIDE, SODIUM LACTATE AND CALCIUM CHLORIDE: 600; 310; 30; 20 INJECTION, SOLUTION INTRAVENOUS at 10:23

## 2021-04-26 RX ADMIN — LIDOCAINE HYDROCHLORIDE 100 MG: 20 INJECTION, SOLUTION EPIDURAL; INFILTRATION; INTRACAUDAL; PERINEURAL at 11:18

## 2021-04-26 RX ADMIN — CEFAZOLIN SODIUM 2000 MG: 2 SOLUTION INTRAVENOUS at 11:13

## 2021-04-26 RX ADMIN — ONDANSETRON 4 MG: 2 INJECTION INTRAMUSCULAR; INTRAVENOUS at 12:02

## 2021-04-26 RX ADMIN — ACETAMINOPHEN 650 MG: 325 TABLET ORAL at 09:47

## 2021-04-26 RX ADMIN — PROPOFOL 200 MG: 10 INJECTION, EMULSION INTRAVENOUS at 11:18

## 2021-04-26 RX ADMIN — DIMENHYDRINATE 50 MG: 50 TABLET ORAL at 09:47

## 2021-04-26 RX ADMIN — KETOROLAC TROMETHAMINE 15 MG: 30 INJECTION, SOLUTION INTRAMUSCULAR; INTRAVENOUS at 12:02

## 2021-04-26 RX ADMIN — DEXAMETHASONE SODIUM PHOSPHATE 4 MG: 4 INJECTION, SOLUTION INTRAMUSCULAR; INTRAVENOUS at 11:18

## 2021-04-26 ASSESSMENT — PAIN SCALES - GENERAL: PAINLEVEL_OUTOF10: 0

## 2021-04-26 NOTE — H&P
HISTORY AND PHYSICAL             Date: 4/26/2021        Patient Name: Devon Lopez     YOB: 1950      Age:  70 y.o. Chief Complaint   No chief complaint on file. History Obtained From   patient    History of Present Illness   Pt with symptomatic rectocele; failed trial of pessary; desires surgical intervention    Past Medical History     Past Medical History:   Diagnosis Date    Abnormal Pap smear of cervix     h/o abnormals in 1980's. Would get repeat paps in 3-6 months after that were normal.    Acid reflux     Hiatal hernia     High cholesterol     History of skin cancer     Hypertension     Thyroid disease         Past Surgical History     Past Surgical History:   Procedure Laterality Date    BREAST REDUCTION SURGERY      w/left cyst    COLONOSCOPY  2013    COLPOPEXY      DILATION AND CURETTAGE      HYSTERECTOMY, VAGINAL  2013    w/BSO    SKIN CANCER EXCISION      basal cell excision    THYROID SURGERY      TUBAL LIGATION          Medications Prior to Admission     Prior to Admission medications    Medication Sig Start Date End Date Taking? Authorizing Provider   omeprazole (PRILOSEC) 40 MG delayed release capsule TAKE 1 CAPSULE BY MOUTH TWICE A DAY 12/8/20  Yes Historical Provider, MD   levothyroxine (SYNTHROID) 112 MCG tablet TAKE 1 TABLET BY MOUTH ONCE DAILY WITH WATER ONLY ON AN EMPTY STOMACH. WAIT AT LEAST 1 2 HOUR TO EAT OR TAKE OTHER MEDS. 11/5/20  Yes Historical Provider, MD   lisinopril-hydroCHLOROthiazide (PRINZIDE;ZESTORETIC) 10-12.5 MG per tablet TAKE 1 TABLET BY MOUTH TWICE A DAY 10/24/20  Yes Historical Provider, MD   lovastatin (MEVACOR) 20 MG tablet TAKE 1 TABLET BY MOUTH EVERY DAY IN THE EVENING 12/29/20  Yes Historical Provider, MD   NONFORMULARY Mag 07 (stool softner)    Historical Provider, MD   Cholecalciferol (VITAMIN D3) 25 MCG TABS Take by mouth    Historical Provider, MD        Allergies   Patient has no known allergies.     Social History Social History     Tobacco History     Smoking Status  Former Smoker Smoking Start Date  1/1/1971 Quit date  1/1/1981 Smoking Frequency  For 10 years    Smokeless Tobacco Use  Never Used          Alcohol History     Alcohol Use Status  Not Currently          Drug Use     Drug Use Status  Never          Sexual Activity     Sexually Active  Not Asked                Family History     Family History   Problem Relation Age of Onset    Diabetes Maternal Grandmother     Heart Attack Father     Stroke Father     Hypertension Mother        Review of Systems   Review of Systems   Constitutional: Negative for chills and fever. Genitourinary: Positive for vaginal pain (bulge). Negative for pelvic pain and vaginal discharge. Physical Exam   BP (!) 149/76   Pulse 77   Temp 97.6 °F (36.4 °C) (Temporal)   Resp 16   Ht 5' 3\" (1.6 m)   Wt 181 lb 6.4 oz (82.3 kg)   SpO2 99%   Breastfeeding No   BMI 32.13 kg/m²     Physical Exam  Constitutional:       Appearance: Normal appearance. HENT:      Head: Normocephalic and atraumatic. Eyes:      Extraocular Movements: Extraocular movements intact. Pupils: Pupils are equal, round, and reactive to light. Neck:      Musculoskeletal: Normal range of motion. Cardiovascular:      Rate and Rhythm: Normal rate. Pulmonary:      Effort: Pulmonary effort is normal.   Musculoskeletal: Normal range of motion. Skin:     General: Skin is warm and dry. Neurological:      Mental Status: She is alert and oriented to person, place, and time. Psychiatric:         Mood and Affect: Mood normal.         Behavior: Behavior normal.         Thought Content: Thought content normal.         Labs    No results found for this or any previous visit (from the past 24 hour(s)). Imaging/Diagnostics Last 24 Hours   No results found. Assessment      rectocele  Plan   1.  Posterior repair    Consultations Ordered:  None    Electronically signed by Ashely Menezes DO on 4/26/21 at 10:58 AM EDT

## 2021-04-26 NOTE — ANESTHESIA PRE PROCEDURE
Department of Anesthesiology  Preprocedure Note       Name:  Ashley Cho   Age:  70 y.o.  :  1950                                          MRN:  [id number]         Date:  2021      Surgeon: Michael Spears):  Jr Linder DO    Procedure: Procedure(s):  VAGINAL REPAIR ANTERIOR AND POSTERIOR--POSTERIOR REPAIR    Medications prior to admission:   Prior to Admission medications    Medication Sig Start Date End Date Taking? Authorizing Provider   omeprazole (PRILOSEC) 40 MG delayed release capsule TAKE 1 CAPSULE BY MOUTH TWICE A DAY 20  Yes Historical Provider, MD   levothyroxine (SYNTHROID) 112 MCG tablet TAKE 1 TABLET BY MOUTH ONCE DAILY WITH WATER ONLY ON AN EMPTY STOMACH. WAIT AT LEAST 1 2 HOUR TO EAT OR TAKE OTHER MEDS. 20  Yes Historical Provider, MD   lisinopril-hydroCHLOROthiazide (PRINZIDE;ZESTORETIC) 10-12.5 MG per tablet TAKE 1 TABLET BY MOUTH TWICE A DAY 10/24/20  Yes Historical Provider, MD   lovastatin (MEVACOR) 20 MG tablet TAKE 1 TABLET BY MOUTH EVERY DAY IN THE EVENING 20  Yes Historical Provider, MD   NONFORMULARY Mag 07 (stool softner)    Historical Provider, MD   Cholecalciferol (VITAMIN D3) 25 MCG TABS Take by mouth    Historical Provider, MD       Current medications:    Current Facility-Administered Medications   Medication Dose Route Frequency Provider Last Rate Last Admin    lactated ringers infusion   Intravenous Continuous Jr Linder  mL/hr at 21 1023 New Bag at 21 1023       Allergies:  No Known Allergies    Problem List:  There is no problem list on file for this patient. Past Medical History:        Diagnosis Date    Abnormal Pap smear of cervix     h/o abnormals in .   Would get repeat paps in 3-6 months after that were normal.    Acid reflux     Hiatal hernia     High cholesterol     History of skin cancer     Hypertension     Thyroid disease        Past Surgical History:        Procedure Laterality Date    BREAST REDUCTION SURGERY      w/left cyst    COLONOSCOPY  2013    COLPOPEXY      DILATION AND CURETTAGE      HYSTERECTOMY, VAGINAL  2013    w/BSO    SKIN CANCER EXCISION      basal cell excision    THYROID SURGERY      TUBAL LIGATION         Social History:    Social History     Tobacco Use    Smoking status: Former Smoker     Years: 10.00     Start date:      Quit date:      Years since quittin.3    Smokeless tobacco: Never Used   Substance Use Topics    Alcohol use: Not Currently                                Counseling given: Not Answered      Vital Signs (Current):   Vitals:    21 1021 21 0945 21 0947   BP:   (!) 149/76   Pulse:   77   Resp:   16   Temp:   36.4 °C (97.6 °F)   TempSrc:   Temporal   SpO2:   99%   Weight: 175 lb (79.4 kg) 181 lb 6.4 oz (82.3 kg)    Height: 5' 3\" (1.6 m) 5' 3\" (1.6 m)                                               BP Readings from Last 3 Encounters:   21 (!) 149/76   21 138/82   21 138/80       NPO Status: Time of last liquid consumption: (sips at 0630 this am with pills)                        Time of last solid consumption:                         Date of last liquid consumption: 21                        Date of last solid food consumption: 21    BMI:   Wt Readings from Last 3 Encounters:   21 181 lb 6.4 oz (82.3 kg)   21 183 lb (83 kg)   21 180 lb (81.6 kg)     Body mass index is 32.13 kg/m².     CBC:   Lab Results   Component Value Date    WBC 6.2 2021    RBC 4.83 2021    HGB 14.6 2021    HCT 44.7 2021    MCV 92.5 2021    RDW 12.7 2021     2021       CMP:   Lab Results   Component Value Date     2021    K 4.3 2021    CL 97 2021    CO2 29 2021    BUN 8 2021    CREATININE 0.49 2021    GFRAA >60 2021    LABGLOM >60 2021    GLUCOSE 72 2021    PROT 7.5 2021    CALCIUM 9.9 04/14/2021    BILITOT 0.22 04/14/2021    ALKPHOS 85 04/14/2021    AST 27 04/14/2021    ALT 23 04/14/2021       POC Tests: No results for input(s): POCGLU, POCNA, POCK, POCCL, POCBUN, POCHEMO, POCHCT in the last 72 hours. Coags: No results found for: PROTIME, INR, APTT    HCG (If Applicable): No results found for: PREGTESTUR, PREGSERUM, HCG, HCGQUANT     ABGs: No results found for: PHART, PO2ART, BKL3SGJ, PJH9VAG, BEART, V9HEMYVU     Type & Screen (If Applicable):  No results found for: LABABO, LABRH    Drug/Infectious Status (If Applicable):  No results found for: HIV, HEPCAB    COVID-19 Screening (If Applicable):   Lab Results   Component Value Date    COVID19 Not Detected 04/19/2021           Anesthesia Evaluation  Patient summary reviewed and Nursing notes reviewed  Airway: Mallampati: I  TM distance: <3 FB   Neck ROM: full  Mouth opening: > = 3 FB Dental: normal exam         Pulmonary:Negative Pulmonary ROS and normal exam              Patient did not smoke on day of surgery. Cardiovascular:  Exercise tolerance: good (>4 METS),   (+) hypertension: mild,                   Neuro/Psych:   Negative Neuro/Psych ROS              GI/Hepatic/Renal:   (+) hiatal hernia, GERD: well controlled,           Endo/Other:    (+) hypothyroidism::., .                 Abdominal:           Vascular: negative vascular ROS. Anesthesia Plan      general     ASA 2       Induction: intravenous. Anesthetic plan and risks discussed with patient.                       ROZ Hernandez - CRNA   4/26/2021

## 2021-04-26 NOTE — OP NOTE
Preoperative diagnosis: Rectocele    Postoperative diagnosis: Same    Procedure: Posterior colporrhaphy  Perineoplasty    Surgeon: Aldo Hardy D.O. Assistant: Isabel     Anesthesia: Gen. Estimated blood loss: Less than 10 mL    Fluids: Lactated Ringer's    Urine: drained with prep    Condition: Stable    Complications: None    Specimen: None    Findings: The patient is status post hysterectomy with a grade 3 rectocele. The  perineal body is noted to have some laxity. Procedure: The patient was taken to the operating room where she was prepped and draped in the usual sterile fashion in a dorsal lithotomy position. A right angle speculum was placed in the patient's vagina and the posterior vaginal wall was grasped in the midline at the level of the hymen with an Allis. A second Allis was then placed at the Campbell of the rectocele. The area between the 2 Allises was injected with 1% lidocaine in 50-50 blend with injectable saline. Approximately 20 mL of this solution was injected to assist with hydrodissection. A scalpel was then used to incise between 2 Allises. Sharp and blunt dissection were used to dissect down to the underlying fascia. The underlying fascia was then reapproximated with interrupted sutures of 2-0 Vicryl. The perineal body was rebuilt with interrupted sutures of 0 Vicryl. The excess vaginal mucosa was then trimmed with Metzenbaum scissors. The vaginal mucosa was closed with running 3-0 Vicryl. Sponge, lap, needle, and instrument counts were correct ×2.   The patient was taken to the recovery area in apparently stable condition

## 2021-04-26 NOTE — PROGRESS NOTES
Patient states ready to be discharged at this time. Discharge instructions given to pt and spouse. Both verbalize understanding,deny any questions and/or concerns. Pt transfer off unit in wheelchair w/ spouse and all belongings. Discharge Criteria    Inpatients must meet Criteria 1 through 7. All other patients are either YES or N/A. If a NO is chosen then Anesthesia or Surgeon must be notified. 1.  Minimum 30 minutes after last dose of sedative medication, minimum 120 minutes after last dose of reversal agent. Yes      2. Systolic BP stable within 20 mmHg for 30 minutes & systolic BP between 90 & 101 or within 10 mmHg of baseline. Yes      3. Pulse between 60 and 100 or within 10 bpm of baseline. Yes      4. Spontaneous respiratory rate >/= 10 per minute. Yes      5. SaO2 >/= 95 or  >/= baseline. Yes      6. Able to cough and swallow or return to baseline function. Yes      7. Alert and oriented or return to baseline mental status. Yes      8. Demonstrates controlled, coordinated movements, ambulates with steady gait, or return to baseline activity function. Yes      9. Minimal or no pain or nausea, or at a level tolerable and acceptable to patient. Yes      10. Takes and retains oral fluids as allowed. Yes      11. Procedural / perioperative site stable. Minimal or no bleeding. Yes          12. If GI endoscopy procedure, minimal or no abdominal distention or passing flatus. N/A      13. Written discharge instructions and emergency telephone number provided. Yes      14. Accompanied by a responsible adult.     Yes

## 2021-05-19 ENCOUNTER — OFFICE VISIT (OUTPATIENT)
Dept: OBGYN CLINIC | Age: 71
End: 2021-05-19

## 2021-05-19 VITALS
SYSTOLIC BLOOD PRESSURE: 130 MMHG | HEART RATE: 72 BPM | BODY MASS INDEX: 32.59 KG/M2 | WEIGHT: 184 LBS | DIASTOLIC BLOOD PRESSURE: 81 MMHG

## 2021-05-19 DIAGNOSIS — Z09 POSTOPERATIVE EXAMINATION: Primary | ICD-10-CM

## 2021-05-19 PROCEDURE — 99024 POSTOP FOLLOW-UP VISIT: CPT | Performed by: OBSTETRICS & GYNECOLOGY

## 2021-05-19 RX ORDER — OXYBUTYNIN CHLORIDE 10 MG/1
10 TABLET, EXTENDED RELEASE ORAL DAILY
Qty: 30 TABLET | Refills: 11 | Status: SHIPPED | OUTPATIENT
Start: 2021-05-19 | End: 2022-05-25

## 2021-05-19 NOTE — PROGRESS NOTES
Postop Progress Note    Subjective    Addis Herman presents to the office for postop follow up. Objective    Vitals:    05/19/21 1121   BP: 130/81   Pulse: 72     General: alert, cooperative and no distress  Incision: healing well    Assessment  Doing well postoperatively. Plan  1. Continue any current medications  2. Wound care discussed  3. Pt is to increase activities as tolerated  4. Usual diet  5.  Follow up: as needed    Electronically signed by Pedrito Kirby DO on 5/19/2021 at 11:51 AM

## 2021-08-23 ENCOUNTER — HOSPITAL ENCOUNTER (OUTPATIENT)
Age: 71
Setting detail: SPECIMEN
Discharge: HOME | End: 2021-08-23
Payer: MEDICARE

## 2021-08-23 ENCOUNTER — OFFICE VISIT (OUTPATIENT)
Dept: OBGYN CLINIC | Age: 71
End: 2021-08-23
Payer: MEDICARE

## 2021-08-23 VITALS — DIASTOLIC BLOOD PRESSURE: 76 MMHG | SYSTOLIC BLOOD PRESSURE: 138 MMHG | WEIGHT: 186 LBS | BODY MASS INDEX: 32.95 KG/M2

## 2021-08-23 DIAGNOSIS — R30.0 DYSURIA: ICD-10-CM

## 2021-08-23 DIAGNOSIS — N76.0 ACUTE VAGINITIS: ICD-10-CM

## 2021-08-23 DIAGNOSIS — R30.0 DYSURIA: Primary | ICD-10-CM

## 2021-08-23 LAB
BILIRUBIN, POC: ABNORMAL
BLOOD URINE, POC: ABNORMAL
CLARITY, POC: ABNORMAL
COLOR, POC: ABNORMAL
GLUCOSE URINE, POC: ABNORMAL
KETONES, POC: ABNORMAL
LEUKOCYTE EST, POC: ABNORMAL
NITRITE, POC: ABNORMAL
PH, POC: 6
PROTEIN, POC: ABNORMAL
SPECIFIC GRAVITY, POC: 1.01
UROBILINOGEN, POC: ABNORMAL

## 2021-08-23 PROCEDURE — G8427 DOCREV CUR MEDS BY ELIG CLIN: HCPCS | Performed by: NURSE PRACTITIONER

## 2021-08-23 PROCEDURE — 99213 OFFICE O/P EST LOW 20 MIN: CPT | Performed by: NURSE PRACTITIONER

## 2021-08-23 PROCEDURE — 1090F PRES/ABSN URINE INCON ASSESS: CPT | Performed by: NURSE PRACTITIONER

## 2021-08-23 PROCEDURE — G8400 PT W/DXA NO RESULTS DOC: HCPCS | Performed by: NURSE PRACTITIONER

## 2021-08-23 PROCEDURE — 4040F PNEUMOC VAC/ADMIN/RCVD: CPT | Performed by: NURSE PRACTITIONER

## 2021-08-23 PROCEDURE — 81002 URINALYSIS NONAUTO W/O SCOPE: CPT | Performed by: NURSE PRACTITIONER

## 2021-08-23 PROCEDURE — 3017F COLORECTAL CA SCREEN DOC REV: CPT | Performed by: NURSE PRACTITIONER

## 2021-08-23 PROCEDURE — 1036F TOBACCO NON-USER: CPT | Performed by: NURSE PRACTITIONER

## 2021-08-23 PROCEDURE — 1123F ACP DISCUSS/DSCN MKR DOCD: CPT | Performed by: NURSE PRACTITIONER

## 2021-08-23 PROCEDURE — G8417 CALC BMI ABV UP PARAM F/U: HCPCS | Performed by: NURSE PRACTITIONER

## 2021-08-23 RX ORDER — FLUCONAZOLE 150 MG/1
150 TABLET ORAL ONCE
Qty: 1 TABLET | Refills: 0 | Status: SHIPPED | OUTPATIENT
Start: 2021-08-23 | End: 2021-08-23

## 2021-08-23 RX ORDER — TRIAMCINOLONE ACETONIDE 0.25 MG/G
OINTMENT TOPICAL
Qty: 1 TUBE | Refills: 0 | Status: SHIPPED | OUTPATIENT
Start: 2021-08-23 | End: 2021-08-30

## 2021-08-23 NOTE — PROGRESS NOTES
presents with    Dysuria     C/O vaginal burning with urination        PE:  Vital Signs  Blood pressure 138/76, weight 186 lb (84.4 kg), not currently breastfeeding. HPI: Patient presents today with complaints of vaginal burning with urination. States the vaginal area continues to burn following urination at times. Symptoms are intermittent and do not occur every time she urinates. Denies vaginal discharge. PT denies fever, chills, nausea and vomiting       Review of Systems   Constitutional: Negative. Genitourinary: Positive for dysuria. Negative for frequency, pelvic pain, vaginal bleeding and vaginal discharge. Physical Exam  Constitutional:       Appearance: Normal appearance. HENT:      Head: Normocephalic. Pulmonary:      Effort: Pulmonary effort is normal.   Genitourinary:     General: Normal vulva. Vagina: No vaginal discharge. Comments: Caking of white discharge around clitoral clarke   Musculoskeletal:         General: Normal range of motion. Neurological:      General: No focal deficit present. Mental Status: She is alert. Psychiatric:         Mood and Affect: Mood normal.         Behavior: Behavior normal.          Chaperone: not present    Assessment and Plan          Diagnosis Orders   1. Dysuria  POCT Urinalysis Dipstick (No Micro)    Culture, Urine   2. Acute vaginitis  VAGINITIS DNA PROBE    fluconazole (DIFLUCAN) 150 MG tablet    triamcinolone (KENALOG) 0.025 % ointment             I am having Theotis Anup. Sultana start on fluconazole and triamcinolone. I am also having her maintain her omeprazole, levothyroxine, lisinopril-hydroCHLOROthiazide, lovastatin, Vitamin D3, oxybutynin, and psyllium. Return if symptoms worsen or fail to improve. There are no Patient Instructions on file for this visit.     ROZ Mcmullen NP,8/26/2021 8:53 PM

## 2021-08-24 LAB
DIRECT EXAM: NORMAL
Lab: NORMAL
SPECIMEN DESCRIPTION: NORMAL

## 2021-08-25 LAB
CULTURE: ABNORMAL
CULTURE: ABNORMAL
Lab: ABNORMAL
SPECIMEN DESCRIPTION: ABNORMAL

## 2021-08-26 RX ORDER — SULFAMETHOXAZOLE AND TRIMETHOPRIM 800; 160 MG/1; MG/1
1 TABLET ORAL 2 TIMES DAILY
Qty: 6 TABLET | Refills: 0 | Status: SHIPPED | OUTPATIENT
Start: 2021-08-26 | End: 2021-08-29

## 2021-08-30 ENCOUNTER — TELEPHONE (OUTPATIENT)
Dept: OBGYN CLINIC | Age: 71
End: 2021-08-30

## 2021-08-30 NOTE — TELEPHONE ENCOUNTER
Pt sched in [facility] office 9/7. Pt wanted to see Dr Johann LEMUS. Records from [facility] ER scanned into [employer].

## 2021-08-30 NOTE — TELEPHONE ENCOUNTER
Patient left a message on the voicemail that she was treated for a UTI last week by Yennifer Wiseman. She states that on 8/28, she went to the ER because symptoms have not gotten any better. She states that the burning is awful and nothing is helping. She then ended up in the ER again earlier this am for projectile vomiting and dehydration. She was given some IV fluids and was told that she needs to call our office for follow up on the vaginal irritation. I have scanned in her ER records. Can you please call her to schedule a follow up?

## 2021-09-01 ENCOUNTER — OFFICE VISIT (OUTPATIENT)
Dept: OBGYN CLINIC | Age: 71
End: 2021-09-01
Payer: MEDICARE

## 2021-09-01 VITALS — BODY MASS INDEX: 31.11 KG/M2 | WEIGHT: 175.6 LBS | SYSTOLIC BLOOD PRESSURE: 110 MMHG | DIASTOLIC BLOOD PRESSURE: 70 MMHG

## 2021-09-01 DIAGNOSIS — N90.89 VULVAR IRRITATION: Primary | ICD-10-CM

## 2021-09-01 PROCEDURE — 1036F TOBACCO NON-USER: CPT | Performed by: NURSE PRACTITIONER

## 2021-09-01 PROCEDURE — 3017F COLORECTAL CA SCREEN DOC REV: CPT | Performed by: NURSE PRACTITIONER

## 2021-09-01 PROCEDURE — 1090F PRES/ABSN URINE INCON ASSESS: CPT | Performed by: NURSE PRACTITIONER

## 2021-09-01 PROCEDURE — 99213 OFFICE O/P EST LOW 20 MIN: CPT | Performed by: NURSE PRACTITIONER

## 2021-09-01 PROCEDURE — 1123F ACP DISCUSS/DSCN MKR DOCD: CPT | Performed by: NURSE PRACTITIONER

## 2021-09-01 PROCEDURE — G8427 DOCREV CUR MEDS BY ELIG CLIN: HCPCS | Performed by: NURSE PRACTITIONER

## 2021-09-01 PROCEDURE — G8400 PT W/DXA NO RESULTS DOC: HCPCS | Performed by: NURSE PRACTITIONER

## 2021-09-01 PROCEDURE — 4040F PNEUMOC VAC/ADMIN/RCVD: CPT | Performed by: NURSE PRACTITIONER

## 2021-09-01 PROCEDURE — G8417 CALC BMI ABV UP PARAM F/U: HCPCS | Performed by: NURSE PRACTITIONER

## 2021-09-01 RX ORDER — CLOBETASOL PROPIONATE 0.5 MG/G
CREAM TOPICAL
Qty: 1 EACH | Refills: 0 | Status: SHIPPED | OUTPATIENT
Start: 2021-09-01 | End: 2021-09-15 | Stop reason: SDUPTHER

## 2021-09-01 RX ORDER — PHENAZOPYRIDINE HYDROCHLORIDE 200 MG/1
TABLET, FILM COATED ORAL
COMMUNITY
Start: 2021-08-29 | End: 2021-09-29

## 2021-09-01 NOTE — PROGRESS NOTES
PROBLEM VISIT     Date of service: 2021    Louise Collins  Is a 70 y.o. female    PT's PCP is: Sriram Monroe     : 1950                                             Subjective:       No LMP recorded. Patient has had a hysterectomy. OB History    Para Term  AB Living   3 2       2   SAB TAB Ectopic Molar Multiple Live Births             2      # Outcome Date GA Lbr Héctor/2nd Weight Sex Delivery Anes PTL Lv   3             2 Para            1 Para                 Social History     Tobacco Use   Smoking Status Former Smoker    Years: 10.00    Start date:     Quit date: 0    Years since quittin.6   Smokeless Tobacco Never Used        Social History     Substance and Sexual Activity   Alcohol Use Not Currently       Allergies: Ciprofloxacin      Current Outpatient Medications:     clobetasol (TEMOVATE) 0.05 % cream, Apply topically 2-3 times daily PRN, Disp: 1 each, Rfl: 0    phenazopyridine (PYRIDIUM) 200 MG tablet, TAKE 1 TABLET BY MOUTH 3 TIMES A DAY FOR 2 DAYS, Disp: , Rfl:     psyllium 0.52 g capsule, Take by mouth, Disp: , Rfl:     oxybutynin (DITROPAN-XL) 10 MG extended release tablet, Take 1 tablet by mouth daily, Disp: 30 tablet, Rfl: 11    omeprazole (PRILOSEC) 40 MG delayed release capsule, daily , Disp: , Rfl:     levothyroxine (SYNTHROID) 112 MCG tablet, TAKE 1 TABLET BY MOUTH ONCE DAILY WITH WATER ONLY ON AN EMPTY STOMACH.  WAIT AT LEAST 1 2 HOUR TO EAT OR TAKE OTHER MEDS., Disp: , Rfl:     lisinopril-hydroCHLOROthiazide (PRINZIDE;ZESTORETIC) 10-12.5 MG per tablet, TAKE 1 TABLET BY MOUTH TWICE A DAY, Disp: , Rfl:     lovastatin (MEVACOR) 20 MG tablet, TAKE 1 TABLET BY MOUTH EVERY DAY IN THE EVENING, Disp: , Rfl:     Cholecalciferol (VITAMIN D3) 25 MCG TABS, Take by mouth, Disp: , Rfl:     Social History     Substance and Sexual Activity   Sexual Activity Not on file     Chief Complaint   Patient presents with    Vaginal Pain     C/O severe vaginal pain. Feels like most of it is external. Burns terribly if urine gets on it. Using Vaseline to help soothe pain. PE:  Vital Signs  Blood pressure 110/70, weight 175 lb 9.6 oz (79.7 kg), not currently breastfeeding. HPI: Patient presents today with complaints of external vaginal burning. describe pain located along the labia majora. Burns when urine comes in contact with the skin. Denies any itching or vaginal discharge. Tried pyridium to see if it would relieve burning, no improvement. Was seen in ER on 8/28 and 8/30; vaginitis probes and UA negative. PT denies fever, chills, nausea and vomiting       Review of Systems   Constitutional: Negative. Genitourinary: Positive for vaginal pain. Negative for dysuria, frequency, pelvic pain, vaginal bleeding and vaginal discharge. Physical Exam  Constitutional:       Appearance: Normal appearance. HENT:      Head: Normocephalic. Pulmonary:      Effort: Pulmonary effort is normal.   Genitourinary:     General: Normal vulva. Vagina: No vaginal discharge, erythema or bleeding. Comments: Pain localized to area surrounding clitoral clarke with palpation-Mild erythema present     Musculoskeletal:         General: Normal range of motion. Neurological:      General: No focal deficit present. Mental Status: She is alert. Psychiatric:         Mood and Affect: Mood normal.         Behavior: Behavior normal.     chaperone: not present     Assessment and Plan          Diagnosis Orders   1. Vulvar irritation       Clobetasol sent to pharmacy. I am having Adin Basfidelia. [facility] maintain her omeprazole, levothyroxine, lisinopril-hydroCHLOROthiazide, lovastatin, Vitamin D3, oxybutynin, psyllium, clobetasol, and phenazopyridine. Return if symptoms worsen or fail to improve. There are no Patient Instructions on file for this visit.     ROZ Grigsby NP,9/1/2021 8:29 PM

## 2021-09-07 ENCOUNTER — OFFICE VISIT (OUTPATIENT)
Dept: OBGYN | Age: 71
End: 2021-09-07
Payer: MEDICARE

## 2021-09-07 VITALS
HEIGHT: 63 IN | SYSTOLIC BLOOD PRESSURE: 128 MMHG | BODY MASS INDEX: 31.89 KG/M2 | WEIGHT: 180 LBS | DIASTOLIC BLOOD PRESSURE: 80 MMHG

## 2021-09-07 DIAGNOSIS — N90.89 VULVAR IRRITATION: Primary | ICD-10-CM

## 2021-09-07 PROCEDURE — 99213 OFFICE O/P EST LOW 20 MIN: CPT | Performed by: OBSTETRICS & GYNECOLOGY

## 2021-09-07 PROCEDURE — 99211 OFF/OP EST MAY X REQ PHY/QHP: CPT

## 2021-09-07 PROCEDURE — G8427 DOCREV CUR MEDS BY ELIG CLIN: HCPCS | Performed by: OBSTETRICS & GYNECOLOGY

## 2021-09-07 PROCEDURE — 3017F COLORECTAL CA SCREEN DOC REV: CPT | Performed by: OBSTETRICS & GYNECOLOGY

## 2021-09-07 PROCEDURE — G8400 PT W/DXA NO RESULTS DOC: HCPCS | Performed by: OBSTETRICS & GYNECOLOGY

## 2021-09-07 PROCEDURE — 1090F PRES/ABSN URINE INCON ASSESS: CPT | Performed by: OBSTETRICS & GYNECOLOGY

## 2021-09-07 PROCEDURE — 1036F TOBACCO NON-USER: CPT | Performed by: OBSTETRICS & GYNECOLOGY

## 2021-09-07 PROCEDURE — G8417 CALC BMI ABV UP PARAM F/U: HCPCS | Performed by: OBSTETRICS & GYNECOLOGY

## 2021-09-07 PROCEDURE — 4040F PNEUMOC VAC/ADMIN/RCVD: CPT | Performed by: OBSTETRICS & GYNECOLOGY

## 2021-09-07 PROCEDURE — 1123F ACP DISCUSS/DSCN MKR DOCD: CPT | Performed by: OBSTETRICS & GYNECOLOGY

## 2021-09-15 ENCOUNTER — OFFICE VISIT (OUTPATIENT)
Dept: OBGYN CLINIC | Age: 71
End: 2021-09-15
Payer: MEDICARE

## 2021-09-15 VITALS — BODY MASS INDEX: 31.35 KG/M2 | DIASTOLIC BLOOD PRESSURE: 70 MMHG | SYSTOLIC BLOOD PRESSURE: 112 MMHG | WEIGHT: 177 LBS

## 2021-09-15 DIAGNOSIS — L90.0 LICHEN SCLEROSUS ET ATROPHICUS: Primary | ICD-10-CM

## 2021-09-15 PROCEDURE — G8417 CALC BMI ABV UP PARAM F/U: HCPCS | Performed by: OBSTETRICS & GYNECOLOGY

## 2021-09-15 PROCEDURE — 4040F PNEUMOC VAC/ADMIN/RCVD: CPT | Performed by: OBSTETRICS & GYNECOLOGY

## 2021-09-15 PROCEDURE — 1036F TOBACCO NON-USER: CPT | Performed by: OBSTETRICS & GYNECOLOGY

## 2021-09-15 PROCEDURE — 1123F ACP DISCUSS/DSCN MKR DOCD: CPT | Performed by: OBSTETRICS & GYNECOLOGY

## 2021-09-15 PROCEDURE — G8400 PT W/DXA NO RESULTS DOC: HCPCS | Performed by: OBSTETRICS & GYNECOLOGY

## 2021-09-15 PROCEDURE — 1090F PRES/ABSN URINE INCON ASSESS: CPT | Performed by: OBSTETRICS & GYNECOLOGY

## 2021-09-15 PROCEDURE — 3017F COLORECTAL CA SCREEN DOC REV: CPT | Performed by: OBSTETRICS & GYNECOLOGY

## 2021-09-15 PROCEDURE — 99212 OFFICE O/P EST SF 10 MIN: CPT | Performed by: OBSTETRICS & GYNECOLOGY

## 2021-09-15 PROCEDURE — G8427 DOCREV CUR MEDS BY ELIG CLIN: HCPCS | Performed by: OBSTETRICS & GYNECOLOGY

## 2021-09-15 RX ORDER — CLOBETASOL PROPIONATE 0.5 MG/G
CREAM TOPICAL
Qty: 45 G | Refills: 2 | Status: SHIPPED | OUTPATIENT
Start: 2021-09-15

## 2021-09-15 NOTE — PROGRESS NOTES
DATE OF VISIT:  9/15/21    PATIENT NAME:  Layla Pate     YOB: 1950    REASON FOR VISIT:    Chief Complaint   Patient presents with    Follow-up     Pt. here to f/u of vaginal irritation. She ran out of clobetasol on monday. She has also been using pinkxav. She still has just a very minor amount of irritation. It is tolerable. \"90% gone\"        HISTORY OF PRESENT ILLNESS:  Pt states symptoms are 90% better - has irritation only at the apex now; disc'd maintenance with clobetasol twice weekly      No LMP recorded. Patient has had a hysterectomy. Vitals:    09/15/21 1452   BP: 112/70   Site: Right Upper Arm   Position: Sitting   Weight: 177 lb (80.3 kg)     Body mass index is 31.35 kg/m². Allergies   Allergen Reactions    Ciprofloxacin      Other reaction(s): burning in feet     Current Outpatient Medications   Medication Sig Dispense Refill    clobetasol (TEMOVATE) 0.05 % cream Apply topically 2 times weekly 45 g 2    phenazopyridine (PYRIDIUM) 200 MG tablet TAKE 1 TABLET BY MOUTH 3 TIMES A DAY FOR 2 DAYS (Patient not taking: Reported on 9/7/2021)      psyllium 0.52 g capsule Take by mouth      oxybutynin (DITROPAN-XL) 10 MG extended release tablet Take 1 tablet by mouth daily (Patient not taking: Reported on 9/7/2021) 30 tablet 11    omeprazole (PRILOSEC) 40 MG delayed release capsule daily       levothyroxine (SYNTHROID) 112 MCG tablet TAKE 1 TABLET BY MOUTH ONCE DAILY WITH WATER ONLY ON AN EMPTY STOMACH. WAIT AT LEAST 1 2 HOUR TO EAT OR TAKE OTHER MEDS.  lisinopril-hydroCHLOROthiazide (PRINZIDE;ZESTORETIC) 10-12.5 MG per tablet TAKE 1 TABLET BY MOUTH TWICE A DAY      lovastatin (MEVACOR) 20 MG tablet TAKE 1 TABLET BY MOUTH EVERY DAY IN THE EVENING      Cholecalciferol (VITAMIN D3) 25 MCG TABS Take by mouth       No current facility-administered medications for this visit.      Social History     Socioeconomic History    Marital status:      Spouse name: Not on file    Number of children: Not on file    Years of education: Not on file    Highest education level: Not on file   Occupational History    Not on file   Tobacco Use    Smoking status: Former Smoker     Years: 10.00     Start date:      Quit date:      Years since quittin.7    Smokeless tobacco: Never Used   Substance and Sexual Activity    Alcohol use: Not Currently    Drug use: Never    Sexual activity: Not on file   Other Topics Concern    Not on file   Social History Narrative    Not on file     Social Determinants of Health     Financial Resource Strain:     Difficulty of Paying Living Expenses:    Food Insecurity:     Worried About [address] in the Last Year:     [address] [employer] in the Last Year:    Transportation Needs:     Lack of Transportation (Medical):  Lack of Transportation (Non-Medical):    Physical Activity:     Days of Exercise per Week:     Minutes of Exercise per Session:    Stress:     Feeling of Stress :    Social Connections:     Frequency of Communication with Friends and Family:     Frequency of Social Gatherings with Friends and Family:     Attends Confucianism Services:     Active Member of Clubs or Organizations:     Attends Club or Organization Meetings:     Marital Status:    Intimate Partner Violence:     Fear of Current or Ex-Partner:     Emotionally Abused:     Physically Abused:     Sexually Abused:        REVIEW OF SYSTEMS:  Review of Systems   Constitutional: Negative for chills and fever. Genitourinary: Negative for urgency, vaginal bleeding, vaginal discharge and vaginal pain. PHYSICAL EXAM:  /70 (Site: Right Upper Arm, Position: Sitting)   Wt 177 lb (80.3 kg)   BMI 31.35 kg/m²   Physical Exam  Constitutional:       Appearance: Normal appearance. Genitourinary:      Vulva normal.      Genitourinary Comments: Healing well   HENT:      Head: Normocephalic and atraumatic.    Eyes:      Extraocular Movements: Extraocular movements intact. Pupils: Pupils are equal, round, and reactive to light. Cardiovascular:      Rate and Rhythm: Normal rate. Pulmonary:      Effort: Pulmonary effort is normal.   Musculoskeletal:         General: Normal range of motion. Cervical back: Normal range of motion. Neurological:      Mental Status: She is alert and oriented to person, place, and time. Skin:     General: Skin is warm. Psychiatric:         Mood and Affect: Mood normal.         Behavior: Behavior normal.         Thought Content: Thought content normal.         Judgment: Judgment normal.         ASSESSMENT:      1. Lichen sclerosus et atrophicus        PLAN:  No orders of the defined types were placed in this encounter.     Return for annual.       Electronically signed by Sharon Reynoso DO on 09/15/21

## 2021-09-29 ENCOUNTER — OFFICE VISIT (OUTPATIENT)
Dept: OBGYN CLINIC | Age: 71
End: 2021-09-29
Payer: MEDICARE

## 2021-09-29 VITALS
DIASTOLIC BLOOD PRESSURE: 81 MMHG | SYSTOLIC BLOOD PRESSURE: 138 MMHG | WEIGHT: 177 LBS | HEART RATE: 77 BPM | BODY MASS INDEX: 31.35 KG/M2

## 2021-09-29 DIAGNOSIS — N81.11 CYSTOCELE, MIDLINE: Primary | ICD-10-CM

## 2021-09-29 PROCEDURE — 1090F PRES/ABSN URINE INCON ASSESS: CPT | Performed by: OBSTETRICS & GYNECOLOGY

## 2021-09-29 PROCEDURE — 99213 OFFICE O/P EST LOW 20 MIN: CPT | Performed by: OBSTETRICS & GYNECOLOGY

## 2021-09-29 PROCEDURE — 1123F ACP DISCUSS/DSCN MKR DOCD: CPT | Performed by: OBSTETRICS & GYNECOLOGY

## 2021-09-29 PROCEDURE — G8417 CALC BMI ABV UP PARAM F/U: HCPCS | Performed by: OBSTETRICS & GYNECOLOGY

## 2021-09-29 PROCEDURE — 4040F PNEUMOC VAC/ADMIN/RCVD: CPT | Performed by: OBSTETRICS & GYNECOLOGY

## 2021-09-29 PROCEDURE — G8400 PT W/DXA NO RESULTS DOC: HCPCS | Performed by: OBSTETRICS & GYNECOLOGY

## 2021-09-29 PROCEDURE — 3017F COLORECTAL CA SCREEN DOC REV: CPT | Performed by: OBSTETRICS & GYNECOLOGY

## 2021-09-29 PROCEDURE — G8427 DOCREV CUR MEDS BY ELIG CLIN: HCPCS | Performed by: OBSTETRICS & GYNECOLOGY

## 2021-09-29 PROCEDURE — 1036F TOBACCO NON-USER: CPT | Performed by: OBSTETRICS & GYNECOLOGY

## 2021-09-29 NOTE — PROGRESS NOTES
DATE OF VISIT:  9/29/21    PATIENT NAME:  Anna Marie Bullard     YOB: 1950    REASON FOR VISIT:    Chief Complaint   Patient presents with    Other     Pt. had anterior/repair in April. Pt. reports that she has felt something drop. She is able to push it back in. \"there is something there\"        HISTORY OF PRESENT ILLNESS:  Pt states that she has noticed a vaginal bulge with showering; concerned that she has \"ruined\" her surgery; previously had posterior repair; denies vaginal discharge, no pain, no urinary or bowel symptoms; no bleeding      No LMP recorded. Patient has had a hysterectomy. Vitals:    09/29/21 0904   BP: 138/81   Site: Left Upper Arm   Position: Sitting   Pulse: 77   Weight: 177 lb (80.3 kg)     Body mass index is 31.35 kg/m². Allergies   Allergen Reactions    Ciprofloxacin      Other reaction(s): burning in feet     Current Outpatient Medications   Medication Sig Dispense Refill    BL GLUCOSAMINE-CHONDROITIN PO Take by mouth      clobetasol (TEMOVATE) 0.05 % cream Apply topically 2 times weekly 45 g 2    psyllium 0.52 g capsule Take by mouth      omeprazole (PRILOSEC) 40 MG delayed release capsule daily       levothyroxine (SYNTHROID) 112 MCG tablet TAKE 1 TABLET BY MOUTH ONCE DAILY WITH WATER ONLY ON AN EMPTY STOMACH. WAIT AT LEAST 1 2 HOUR TO EAT OR TAKE OTHER MEDS.  lisinopril-hydroCHLOROthiazide (PRINZIDE;ZESTORETIC) 10-12.5 MG per tablet TAKE 1 TABLET BY MOUTH TWICE A DAY      lovastatin (MEVACOR) 20 MG tablet TAKE 1 TABLET BY MOUTH EVERY DAY IN THE EVENING      Cholecalciferol (VITAMIN D3) 25 MCG TABS Take by mouth      oxybutynin (DITROPAN-XL) 10 MG extended release tablet Take 1 tablet by mouth daily (Patient not taking: Reported on 9/29/2021) 30 tablet 11     No current facility-administered medications for this visit.      Social History     Socioeconomic History    Marital status:      Spouse name: None    Number of children: None    Years of education: None    Highest education level: None   Occupational History    None   Tobacco Use    Smoking status: Former Smoker     Years: 10.00     Start date:      Quit date: 1981     Years since quittin.7    Smokeless tobacco: Never Used   Substance and Sexual Activity    Alcohol use: Not Currently    Drug use: Never    Sexual activity: None   Other Topics Concern    None   Social History Narrative    None     Social Determinants of Health     Financial Resource Strain:     Difficulty of Paying Living Expenses:    Food Insecurity:     Worried About Running Out of Food in the Last Year:     [address] in the Last Year:    Transportation Needs:     Lack of Transportation (Medical):  Lack of Transportation (Non-Medical):    Physical Activity:     Days of Exercise per Week:     Minutes of Exercise per Session:    Stress:     Feeling of Stress :    Social Connections:     Frequency of Communication with Friends and Family:     Frequency of Social Gatherings with Friends and Family:     Attends [employer] Services:     Active Member of Clubs or Organizations:     Attends Club or Organization Meetings:     Marital Status:    Intimate Partner Violence:     Fear of Current or Ex-Partner:     Emotionally Abused:     Physically Abused:     Sexually Abused:        REVIEW OF SYSTEMS:  Review of Systems   Constitutional: Negative for chills and fever. Genitourinary: Negative for dysuria, pelvic pain, urgency, vaginal discharge and vaginal pain. PHYSICAL EXAM:  /81 (Site: Left Upper Arm, Position: Sitting)   Pulse 77   Wt 177 lb (80.3 kg)   BMI 31.35 kg/m²   Physical Exam  Constitutional:       Appearance: Normal appearance. Genitourinary:      Vulva normal.      Vaginal atrophy and prolapse (g2 cystocele) present. Cervix is absent. Uterus is absent.       Genitourinary Comments: Posterior repair is well healed and supported - anterior wall with physiologic relaxation   HENT:      Head: Normocephalic and atraumatic. Eyes:      Pupils: Pupils are equal, round, and reactive to light. Cardiovascular:      Rate and Rhythm: Normal rate. Pulmonary:      Effort: Pulmonary effort is normal.   Musculoskeletal:         General: Normal range of motion. Neurological:      Mental Status: She is alert and oriented to person, place, and time. Skin:     General: Skin is warm. Psychiatric:         Mood and Affect: Mood normal.         Behavior: Behavior normal.         Thought Content: Thought content normal.         Judgment: Judgment normal.       The patient, Alejandra Beatty is a 70 y.o. female, was seen with a total time spent of 20 minutes for the visit on this date of service by the E/M provider. The time component had both face to face and non face to face time spent in determining the total time component. Counseling and education regarding her diagnosis listed below and her options regarding those diagnoses were also included in determining her time component. Diagnosis Orders   1. Cystocele, midline       Reassured pt that her surgery is holding - disc'd normal relaxation of vaginal walls harika after childbirth and menopause. The patient had her preventative health maintenance recommendations and follow-up reviewed with her at the completion of her visit. ASSESSMENT:      1. Cystocele, midline        PLAN:  No orders of the defined types were placed in this encounter.     Return for annual.       Electronically signed by Criselda Biggs DO on 10/03/21

## 2022-05-11 NOTE — PROGRESS NOTES
YEARLY PHYSICAL    Date of service: 2022    Layla Pate  Is a 67 y.o.   female    PT's PCP is: Nestor Christie     : 1950                                         Chaperone for Intimate Exam   Chaperone was offered as part of the rooming process. Patient declined and agrees to continue with exam without a chaperone.  Chaperone: na      Subjective:       No LMP recorded. Patient has had a hysterectomy. Are your menses regular: not applicable    OB History    Para Term  AB Living   3 2       2   SAB IAB Ectopic Molar Multiple Live Births             2      # Outcome Date GA Lbr Héctor/2nd Weight Sex Delivery Anes PTL Lv   3             2 Para            1 Para                 Social History     Tobacco Use   Smoking Status Former Smoker    Years: 10.00    Start date:     Quit date: 0    Years since quittin.4   Smokeless Tobacco Never Used        Social History     Substance and Sexual Activity   Alcohol Use Not Currently       Family History   Problem Relation Age of Onset    Diabetes Maternal Grandmother     Heart Attack Father     Stroke Father     Hypertension Mother        Any family history of breast or ovarian cancer: No    Any family history of blood clots: No      Allergies: Ciprofloxacin      Current Outpatient Medications:     NONFORMULARY, jv 7 (OTC stool softner), Disp: , Rfl:     BL GLUCOSAMINE-CHONDROITIN PO, Take by mouth, Disp: , Rfl:     clobetasol (TEMOVATE) 0.05 % cream, Apply topically 2 times weekly, Disp: 45 g, Rfl: 2    omeprazole (PRILOSEC) 40 MG delayed release capsule, daily , Disp: , Rfl:     levothyroxine (SYNTHROID) 112 MCG tablet, TAKE 1 TABLET BY MOUTH ONCE DAILY WITH WATER ONLY ON AN EMPTY STOMACH.  WAIT AT LEAST 1 2 HOUR TO EAT OR TAKE OTHER MEDS., Disp: , Rfl:     lisinopril-hydroCHLOROthiazide (PRINZIDE;ZESTORETIC) 10-12.5 MG per tablet, TAKE 1 TABLET BY MOUTH TWICE A DAY, Disp: , Rfl:     lovastatin (MEVACOR) 20 MG tablet, TAKE 1 TABLET BY MOUTH EVERY DAY IN THE EVENING, Disp: , Rfl:     Cholecalciferol (VITAMIN D3) 25 MCG TABS, Take by mouth, Disp: , Rfl:     Social History     Substance and Sexual Activity   Sexual Activity Not on file       Any bleeding or pain with intercourse: No    Last Yearly:      Last pap: 2017 pt is s/p hysterectomy    Last HPV: 2015    Last Mammogram: Fall 2021    Last Dexascan Fall 2021    Last colorectal screen- type:colonoscopy  date  2013    Do you do self breast exams: Yes    Past Medical History:   Diagnosis Date    Abnormal Pap smear of cervix     h/o abnormals in 1980's. Would get repeat paps in 3-6 months after that were normal.    Acid reflux     Hiatal hernia     High cholesterol     History of skin cancer     Hypertension     Thyroid disease        Past Surgical History:   Procedure Laterality Date    BREAST REDUCTION SURGERY      w/left cyst    COLONOSCOPY  2013    COLPOPEXY      DILATION AND CURETTAGE      HYSTERECTOMY, VAGINAL  2013    w/BSO    OTHER SURGICAL HISTORY  04/26/2021    posterior vaginal repair    SKIN CANCER EXCISION      basal cell excision    THYROID SURGERY      TUBAL LIGATION      VAGINA SURGERY N/A 4/26/2021    VAGINAL REPAIR ANTERIOR AND POSTERIOR--POSTERIOR REPAIR performed by Desi Paula DO at [facility] OR       Family History   Problem Relation Age of Onset    Diabetes Maternal Grandmother     Heart Attack Father     Stroke Father     Hypertension Mother        Chief Complaint   Patient presents with    Annual Exam    Other     Pt. had anterior/repair in April. Pt. reports that she has felt something drop. She is able to push it back in.  Pt. reports if she lifts something or if bowels or bladder get full stx worsen. she is able to push it back in. Clarification -Pt only had rectocele repair last year.     PE:  Vital Signs  Blood pressure 138/76, weight 184 lb (83.5 kg), not currently breastfeeding. Estimated body mass index is 32.59 kg/m² as calculated from the following:    Height as of 9/7/21: 5' 3\" (1.6 m). Weight as of this encounter: 184 lb (83.5 kg). Labs:    No results found for this visit on 05/25/22. No data recorded    NURSE: Janine Mason    HPI: here for annual exam    Review of Systems   Constitutional: Negative for chills, fatigue and fever. Respiratory: Negative for shortness of breath. Cardiovascular: Negative for chest pain. Gastrointestinal: Negative for abdominal pain, constipation and diarrhea. Genitourinary: Positive for vaginal pain (bulge again). Negative for dysuria, enuresis, frequency, menstrual problem, pelvic pain, urgency and vaginal bleeding. States she can reduce ant bulge; seems ok unless constipated or lifting   Neurological: Negative for dizziness, light-headedness and headaches. Physical Exam  Constitutional:       General: She is not in acute distress. Appearance: Normal appearance. She is not ill-appearing. Genitourinary:      Vulva normal.      Vaginal cuff intact. No vaginal discharge. Anterior vaginal prolapse present. Vaginal exam comments: Enterocele v cystocele. Right Adnexa: not tender. Left Adnexa: not tender. Cervix is absent. Uterus is absent. Breasts:      Right: No mass, nipple discharge, skin change or tenderness. Left: No mass, nipple discharge, skin change or tenderness. HENT:      Head: Normocephalic. Cardiovascular:      Rate and Rhythm: Normal rate and regular rhythm. Pulmonary:      Effort: Pulmonary effort is normal.      Breath sounds: Normal breath sounds. Abdominal:      Palpations: Abdomen is soft. Tenderness: There is no abdominal tenderness. There is no guarding or rebound. Musculoskeletal:         General: Normal range of motion. Neurological:      General: No focal deficit present. Mental Status: She is alert. Psychiatric:         Mood and Affect: Mood normal.         Behavior: Behavior normal.                                   Assessment and Plan          Diagnosis Orders   1. Women's annual routine gynecological examination         Repeat Annual every 1 year  Cervical Cytology Evaluation begins at 24years old. If Negative Cytology, Follow-up screening per current guidelines. Mammograms every 1year. If 37 yo and last mammogram was negative. Routine healthmaintenance per patients PCP. I am having Caleb Gannon Sultana maintain her omeprazole, levothyroxine, lisinopril-hydroCHLOROthiazide, lovastatin, Vitamin D3, clobetasol, BL GLUCOSAMINE-CHONDROITIN PO, and NONFORMULARY. Return in about 2 years (around 5/25/2024) for annual.    She was also counseled on her preventative health maintenance recommendations and follow-up. There are no Patient Instructions on file for this visit.     Moe Gauthier DO,5/25/2022 11:14 AM

## 2022-05-25 ENCOUNTER — OFFICE VISIT (OUTPATIENT)
Dept: OBGYN CLINIC | Age: 72
End: 2022-05-25
Payer: MEDICARE

## 2022-05-25 VITALS — DIASTOLIC BLOOD PRESSURE: 76 MMHG | BODY MASS INDEX: 32.59 KG/M2 | SYSTOLIC BLOOD PRESSURE: 138 MMHG | WEIGHT: 184 LBS

## 2022-05-25 DIAGNOSIS — Z01.419 WOMEN'S ANNUAL ROUTINE GYNECOLOGICAL EXAMINATION: Primary | ICD-10-CM

## 2022-05-25 PROCEDURE — G8427 DOCREV CUR MEDS BY ELIG CLIN: HCPCS | Performed by: OBSTETRICS & GYNECOLOGY

## 2022-05-25 PROCEDURE — G0101 CA SCREEN;PELVIC/BREAST EXAM: HCPCS | Performed by: OBSTETRICS & GYNECOLOGY

## 2022-05-25 PROCEDURE — G8417 CALC BMI ABV UP PARAM F/U: HCPCS | Performed by: OBSTETRICS & GYNECOLOGY

## 2022-05-25 ASSESSMENT — ENCOUNTER SYMPTOMS
CONSTIPATION: 0
ABDOMINAL PAIN: 0
SHORTNESS OF BREATH: 0
DIARRHEA: 0

## 2024-06-11 NOTE — PROGRESS NOTES
YEARLY PHYSICAL    Date of service: 2024    Alaina Weinberg  Is a 74 y.o. female    PT's PCP is: Ras James MD     : 1950                                         Chaperone for Intimate Exam  Chaperone was offered as part of the rooming process. Patient declined and agrees to continue with exam without a chaperone.        Subjective:       No LMP recorded. Patient has had a hysterectomy.     Are your menses regular: not applicable    OB History    Para Term  AB Living   3 2       2   SAB IAB Ectopic Molar Multiple Live Births             2      # Outcome Date GA Lbr Héctor/2nd Weight Sex Delivery Anes PTL Lv   3             2 Para            1 Para                 Social History     Tobacco Use   Smoking Status Former    Current packs/day: 0.00    Types: Cigarettes    Start date: 1971    Quit date:     Years since quittin.4   Smokeless Tobacco Never        Social History     Substance and Sexual Activity   Alcohol Use Not Currently       Family History   Problem Relation Age of Onset    Diabetes Maternal Grandmother     Heart Attack Father     Stroke Father     Hypertension Mother        Any family history of breast or ovarian cancer: No    Any family history of blood clots: No      Allergies: Ciprofloxacin      Current Outpatient Medications:     betamethasone valerate (VALISONE) 0.1 % cream, Apply topically, Disp: , Rfl:     CRANBERRY PO, Take by mouth, Disp: , Rfl:     clobetasol (TEMOVATE) 0.05 % cream, Apply topically 2 times weekly, Disp: 45 g, Rfl: 2    NONFORMULARY, jv 7 (OTC stool softner), Disp: , Rfl:     BL GLUCOSAMINE-CHONDROITIN PO, Take by mouth, Disp: , Rfl:     omeprazole (PRILOSEC) 40 MG delayed release capsule, daily , Disp: , Rfl:     levothyroxine (SYNTHROID) 112 MCG tablet, TAKE 1 TABLET BY MOUTH ONCE DAILY WITH WATER ONLY ON AN EMPTY STOMACH. WAIT AT LEAST 1 2 HOUR TO EAT OR

## 2024-06-12 ENCOUNTER — OFFICE VISIT (OUTPATIENT)
Dept: OBGYN CLINIC | Age: 74
End: 2024-06-12
Payer: MEDICARE

## 2024-06-12 VITALS
HEIGHT: 63 IN | BODY MASS INDEX: 32.11 KG/M2 | SYSTOLIC BLOOD PRESSURE: 130 MMHG | WEIGHT: 181.2 LBS | DIASTOLIC BLOOD PRESSURE: 78 MMHG

## 2024-06-12 DIAGNOSIS — N81.6 RECTOCELE: ICD-10-CM

## 2024-06-12 DIAGNOSIS — N90.4 LICHEN SCLEROSUS ET ATROPHICUS OF THE VULVA: ICD-10-CM

## 2024-06-12 DIAGNOSIS — Z01.419 WOMEN'S ANNUAL ROUTINE GYNECOLOGICAL EXAMINATION: Primary | ICD-10-CM

## 2024-06-12 PROCEDURE — G0101 CA SCREEN;PELVIC/BREAST EXAM: HCPCS | Performed by: OBSTETRICS & GYNECOLOGY

## 2024-06-12 PROCEDURE — G8417 CALC BMI ABV UP PARAM F/U: HCPCS | Performed by: OBSTETRICS & GYNECOLOGY

## 2024-06-12 PROCEDURE — G8428 CUR MEDS NOT DOCUMENT: HCPCS | Performed by: OBSTETRICS & GYNECOLOGY

## 2024-06-12 RX ORDER — CLOBETASOL PROPIONATE 0.5 MG/G
CREAM TOPICAL
Qty: 45 G | Refills: 2 | Status: SHIPPED | OUTPATIENT
Start: 2024-06-12

## 2024-06-12 ASSESSMENT — ENCOUNTER SYMPTOMS
CONSTIPATION: 0
DIARRHEA: 0
ABDOMINAL PAIN: 0
SHORTNESS OF BREATH: 0

## 2024-10-22 ENCOUNTER — TELEPHONE (OUTPATIENT)
Dept: OBGYN CLINIC | Age: 74
End: 2024-10-22

## 2024-10-22 NOTE — TELEPHONE ENCOUNTER
Patient was in for an annual 6/12/24.  At that time, you sent a note that patient needed a Posterior Repair.  Patient previously had a posterior repair and a message was sent to you on 6/24 with additional information.  I do not see that anything else has happened with that note.  Her additional information from 6/24: \"    I spoke to patient and reviewed her previous surgery was a Posterior Repair.  She states that she notices things falling down that she needs to push back up.  Worse with an increase in activity or standing for long periods of time.  She does also notice that it is worse if she is constipated, but using Metamucil to help with that.  She does occ have some urinary incontinence where she is unable to get to the bathroom on time, but that is very infrequent.  She denies any HEATHER symptoms.      \"  Can you please review and give any further recommendations?

## 2024-10-29 NOTE — TELEPHONE ENCOUNTER
I spoke to patient and reviewed Dr. Eddy's recommendations.  She has traveling coming up in the next few weeks.  She has been doing ok with her symptoms recently, so nothing urgent.  She will consider her options and call back if/when she is ready to schedule.

## 2024-10-31 DIAGNOSIS — I10 ESSENTIAL HYPERTENSION, BENIGN: ICD-10-CM

## 2024-10-31 DIAGNOSIS — Z01.812 ENCOUNTER FOR PRE-OPERATIVE LABORATORY TESTING: Primary | ICD-10-CM

## 2024-10-31 DIAGNOSIS — N81.11 CYSTOCELE, MIDLINE: ICD-10-CM

## 2024-10-31 NOTE — TELEPHONE ENCOUNTER
Patient called back and wanted to schedule. We reviewed surgery expectations and recovery.  She is penciled in for a Vaginal Anterior Repair at [facility] on 1/24/2025.  She is aware that a nurse from [facility] will call her to complete a phone interview and arrange COVID testing.  She is retired and denies needing a note for work.  Patient will come in to see Dr. Eddy prior to surgery and will get labs at that visit.  Patient will go to [facility] to have an EKG prior to surgery.  We will also follow up 2 and 6 weeks after surgery.  Appointments scheduled.  Patient verbalized understanding, no further questions/concerns voiced.

## 2024-12-18 LAB — MAMMOGRAPHY, EXTERNAL: NORMAL

## 2025-01-15 ENCOUNTER — OFFICE VISIT (OUTPATIENT)
Dept: OBGYN CLINIC | Age: 75
End: 2025-01-15
Payer: MEDICARE

## 2025-01-15 ENCOUNTER — HOSPITAL ENCOUNTER (OUTPATIENT)
Age: 75
Setting detail: SPECIMEN
Discharge: HOME | End: 2025-01-15

## 2025-01-15 VITALS
WEIGHT: 187 LBS | DIASTOLIC BLOOD PRESSURE: 84 MMHG | BODY MASS INDEX: 33.13 KG/M2 | SYSTOLIC BLOOD PRESSURE: 132 MMHG | HEIGHT: 63 IN

## 2025-01-15 DIAGNOSIS — N81.11 CYSTOCELE, MIDLINE: Primary | ICD-10-CM

## 2025-01-15 DIAGNOSIS — N81.11 CYSTOCELE, MIDLINE: ICD-10-CM

## 2025-01-15 DIAGNOSIS — I10 ESSENTIAL HYPERTENSION, BENIGN: ICD-10-CM

## 2025-01-15 DIAGNOSIS — Z01.812 ENCOUNTER FOR PRE-OPERATIVE LABORATORY TESTING: ICD-10-CM

## 2025-01-15 LAB
ALBUMIN SERPL-MCNC: 4.3 G/DL (ref 3.5–5.2)
ALBUMIN/GLOB SERPL: 1.7 {RATIO} (ref 1–2.5)
ALP SERPL-CCNC: 78 U/L (ref 35–104)
ALT SERPL-CCNC: 21 U/L (ref 10–35)
ANION GAP SERPL CALCULATED.3IONS-SCNC: 11 MMOL/L (ref 9–16)
AST SERPL-CCNC: 19 U/L (ref 10–35)
BASOPHILS # BLD: 0.04 K/UL (ref 0–0.2)
BASOPHILS NFR BLD: 1 % (ref 0–2)
BILIRUB SERPL-MCNC: 0.4 MG/DL (ref 0–1.2)
BUN SERPL-MCNC: 14 MG/DL (ref 8–23)
CALCIUM SERPL-MCNC: 9.7 MG/DL (ref 8.6–10.4)
CHLORIDE SERPL-SCNC: 96 MMOL/L (ref 98–107)
CO2 SERPL-SCNC: 29 MMOL/L (ref 20–31)
CREAT SERPL-MCNC: 0.5 MG/DL (ref 0.6–0.9)
EOSINOPHIL # BLD: 0.07 K/UL (ref 0–0.44)
EOSINOPHILS RELATIVE PERCENT: 1 % (ref 1–4)
ERYTHROCYTE [DISTWIDTH] IN BLOOD BY AUTOMATED COUNT: 14.1 % (ref 11.8–14.4)
GFR, ESTIMATED: >90 ML/MIN/1.73M2
GLUCOSE SERPL-MCNC: 80 MG/DL (ref 74–99)
HCT VFR BLD AUTO: 44.1 % (ref 36.3–47.1)
HGB BLD-MCNC: 14 G/DL (ref 11.9–15.1)
IMM GRANULOCYTES # BLD AUTO: 0.03 K/UL (ref 0–0.3)
IMM GRANULOCYTES NFR BLD: 0 %
LYMPHOCYTES NFR BLD: 2.16 K/UL (ref 1.1–3.7)
LYMPHOCYTES RELATIVE PERCENT: 29 % (ref 24–43)
MCH RBC QN AUTO: 30.1 PG (ref 25.2–33.5)
MCHC RBC AUTO-ENTMCNC: 31.7 G/DL (ref 28.4–34.8)
MCV RBC AUTO: 94.8 FL (ref 82.6–102.9)
MONOCYTES NFR BLD: 0.49 K/UL (ref 0.1–1.2)
MONOCYTES NFR BLD: 7 % (ref 3–12)
NEUTROPHILS NFR BLD: 62 % (ref 36–65)
NEUTS SEG NFR BLD: 4.76 K/UL (ref 1.5–8.1)
NRBC BLD-RTO: 0 PER 100 WBC
PLATELET # BLD AUTO: 232 K/UL (ref 138–453)
PMV BLD AUTO: 10.9 FL (ref 8.1–13.5)
POTASSIUM SERPL-SCNC: 4.5 MMOL/L (ref 3.7–5.3)
PROT SERPL-MCNC: 6.8 G/DL (ref 6.6–8.7)
RBC # BLD AUTO: 4.65 M/UL (ref 3.95–5.11)
SODIUM SERPL-SCNC: 136 MMOL/L (ref 136–145)
WBC OTHER # BLD: 7.6 K/UL (ref 3.5–11.3)

## 2025-01-15 PROCEDURE — 1090F PRES/ABSN URINE INCON ASSESS: CPT | Performed by: OBSTETRICS & GYNECOLOGY

## 2025-01-15 PROCEDURE — 1036F TOBACCO NON-USER: CPT | Performed by: OBSTETRICS & GYNECOLOGY

## 2025-01-15 PROCEDURE — 3017F COLORECTAL CA SCREEN DOC REV: CPT | Performed by: OBSTETRICS & GYNECOLOGY

## 2025-01-15 PROCEDURE — 1159F MED LIST DOCD IN RCRD: CPT | Performed by: OBSTETRICS & GYNECOLOGY

## 2025-01-15 PROCEDURE — G8417 CALC BMI ABV UP PARAM F/U: HCPCS | Performed by: OBSTETRICS & GYNECOLOGY

## 2025-01-15 PROCEDURE — 1123F ACP DISCUSS/DSCN MKR DOCD: CPT | Performed by: OBSTETRICS & GYNECOLOGY

## 2025-01-15 PROCEDURE — G8399 PT W/DXA RESULTS DOCUMENT: HCPCS | Performed by: OBSTETRICS & GYNECOLOGY

## 2025-01-15 PROCEDURE — 99213 OFFICE O/P EST LOW 20 MIN: CPT | Performed by: OBSTETRICS & GYNECOLOGY

## 2025-01-15 PROCEDURE — G8427 DOCREV CUR MEDS BY ELIG CLIN: HCPCS | Performed by: OBSTETRICS & GYNECOLOGY

## 2025-01-15 NOTE — PROGRESS NOTES
in this encounter.    Return for anterior colporrhaphy.       Electronically signed by Julita Shaver DO on 01/15/25

## 2025-01-15 NOTE — H&P (VIEW-ONLY)
DATE OF VISIT:  1/15/25    PATIENT NAME:  Alaina Weinberg     YOB: 1950    REASON FOR VISIT:    Chief Complaint   Patient presents with    Vaginal Pain     Patient is being seen to discuss ongoing vaginal issues and treatment options.         HISTORY OF PRESENT ILLNESS:  Pt is symptomatic with cystocele and interested in surgical correction; disc'd anterior repair; r/b/a reviewed; restrictions and recovery disc'd        No LMP recorded. Patient has had a hysterectomy.  Vitals:    01/15/25 0808   BP: 132/84   Weight: 84.8 kg (187 lb)   Height: 1.6 m (5' 3\")     Body mass index is 33.13 kg/m².  Allergies   Allergen Reactions    Ciprofloxacin      Other reaction(s): burning in feet     Current Outpatient Medications   Medication Sig Dispense Refill    CRANBERRY PO Take by mouth      clobetasol (TEMOVATE) 0.05 % cream Apply topically 2 times weekly 45 g 2    NONFORMULARY jv 7 (OTC stool softner)      BL GLUCOSAMINE-CHONDROITIN PO Take by mouth      omeprazole (PRILOSEC) 40 MG delayed release capsule daily       levothyroxine (SYNTHROID) 112 MCG tablet TAKE 1 TABLET BY MOUTH ONCE DAILY WITH WATER ONLY ON AN EMPTY STOMACH. WAIT AT LEAST 1 2 HOUR TO EAT OR TAKE OTHER MEDS.      lisinopril-hydroCHLOROthiazide (PRINZIDE;ZESTORETIC) 10-12.5 MG per tablet TAKE 1 TABLET BY MOUTH TWICE A DAY      lovastatin (MEVACOR) 20 MG tablet TAKE 1 TABLET BY MOUTH EVERY DAY IN THE EVENING      Cholecalciferol (VITAMIN D3) 25 MCG TABS Take by mouth      betamethasone valerate (VALISONE) 0.1 % cream Apply topically (Patient not taking: Reported on 1/15/2025)       No current facility-administered medications for this visit.     Social History     Socioeconomic History    Marital status:      Spouse name: None    Number of children: None    Years of education: None    Highest education level: None   Tobacco Use    Smoking status: Former     Current packs/day: 0.00     Types: Cigarettes     Start date: 1/1/1971     Quit

## 2025-01-17 ENCOUNTER — TELEPHONE (OUTPATIENT)
Dept: PREADMISSION TESTING | Age: 75
End: 2025-01-17

## 2025-01-17 ENCOUNTER — PATIENT MESSAGE (OUTPATIENT)
Dept: OBGYN CLINIC | Age: 75
End: 2025-01-17

## 2025-01-17 NOTE — TELEPHONE ENCOUNTER
I spoke with Alaina and she is scheduled on 1/20/2025 to have her EKG done at [facility]. Thanks!

## 2025-01-17 NOTE — PROGRESS NOTES
Attempted PAT phone call; no answer; message left to return PAT phone call. Patient needs reminded that she needs to complete her EKG.

## 2025-01-17 NOTE — PROGRESS NOTES
Patient instructed on the pre-operative, intra-operative, and post-operative process. Patient instructed on NPO status. Medication instructions and pre operative instruction sheet reviewed with the patient. CHG skin prep instructions reviewed with patient. NPO status (including no gum, hard candy, mints, water, coffee, or smoking) was reviewed and patient verbalizes understanding. Patient was instructed to stop OTC vitamins/supplements (which she stated she stopped today) and to take her levothyroxine with a small sip of water. Patient denies any fever related illnesses or antibiotic use in the last 4 weeks. Patient was reminded of the need for an EKG, which she states she has scheduled on Monday, 1/20/2025 at [facility].

## 2025-01-21 ENCOUNTER — TELEPHONE (OUTPATIENT)
Dept: PREADMISSION TESTING | Age: 75
End: 2025-01-21

## 2025-01-21 ENCOUNTER — ANESTHESIA EVENT (OUTPATIENT)
Dept: OPERATING ROOM | Age: 75
End: 2025-01-21
Payer: MEDICARE

## 2025-01-21 NOTE — TELEPHONE ENCOUNTER
Patient is scheduled on 1/24 for a vaginal anterior repair with Dr. Eddy. Please review pre op EKG (which can be found under media). Thank you!

## 2025-01-24 ENCOUNTER — ANESTHESIA (OUTPATIENT)
Dept: OPERATING ROOM | Age: 75
End: 2025-01-24
Payer: MEDICARE

## 2025-01-24 ENCOUNTER — HOSPITAL ENCOUNTER (OUTPATIENT)
Age: 75
Setting detail: OUTPATIENT SURGERY
Discharge: HOME | End: 2025-01-24
Attending: OBSTETRICS & GYNECOLOGY | Admitting: OBSTETRICS & GYNECOLOGY
Payer: MEDICARE

## 2025-01-24 VITALS
DIASTOLIC BLOOD PRESSURE: 42 MMHG | RESPIRATION RATE: 18 BRPM | OXYGEN SATURATION: 97 % | BODY MASS INDEX: 32.32 KG/M2 | SYSTOLIC BLOOD PRESSURE: 144 MMHG | HEIGHT: 63 IN | HEART RATE: 66 BPM | TEMPERATURE: 96.9 F | WEIGHT: 182.4 LBS

## 2025-01-24 DIAGNOSIS — G89.18 POSTOPERATIVE PAIN: Primary | ICD-10-CM

## 2025-01-24 PROCEDURE — 2580000003 HC RX 258: Performed by: OBSTETRICS & GYNECOLOGY

## 2025-01-24 PROCEDURE — 3700000001 HC ADD 15 MINUTES (ANESTHESIA): Performed by: OBSTETRICS & GYNECOLOGY

## 2025-01-24 PROCEDURE — 3700000000 HC ANESTHESIA ATTENDED CARE: Performed by: OBSTETRICS & GYNECOLOGY

## 2025-01-24 PROCEDURE — 7100000010 HC PHASE II RECOVERY - FIRST 15 MIN: Performed by: OBSTETRICS & GYNECOLOGY

## 2025-01-24 PROCEDURE — 6360000002 HC RX W HCPCS

## 2025-01-24 PROCEDURE — 6360000002 HC RX W HCPCS: Performed by: NURSE ANESTHETIST, CERTIFIED REGISTERED

## 2025-01-24 PROCEDURE — 3600000012 HC SURGERY LEVEL 2 ADDTL 15MIN: Performed by: OBSTETRICS & GYNECOLOGY

## 2025-01-24 PROCEDURE — 3600000002 HC SURGERY LEVEL 2 BASE: Performed by: OBSTETRICS & GYNECOLOGY

## 2025-01-24 PROCEDURE — 2709999900 HC NON-CHARGEABLE SUPPLY: Performed by: OBSTETRICS & GYNECOLOGY

## 2025-01-24 PROCEDURE — 6360000002 HC RX W HCPCS: Performed by: OBSTETRICS & GYNECOLOGY

## 2025-01-24 PROCEDURE — 2580000003 HC RX 258

## 2025-01-24 PROCEDURE — 6370000000 HC RX 637 (ALT 250 FOR IP): Performed by: NURSE ANESTHETIST, CERTIFIED REGISTERED

## 2025-01-24 PROCEDURE — 7100000011 HC PHASE II RECOVERY - ADDTL 15 MIN: Performed by: OBSTETRICS & GYNECOLOGY

## 2025-01-24 PROCEDURE — 57240 ANTERIOR COLPORRHAPHY: CPT | Performed by: OBSTETRICS & GYNECOLOGY

## 2025-01-24 RX ORDER — DEXAMETHASONE SODIUM PHOSPHATE 4 MG/ML
INJECTION, SOLUTION INTRA-ARTICULAR; INTRALESIONAL; INTRAMUSCULAR; INTRAVENOUS; SOFT TISSUE
Status: DISCONTINUED | OUTPATIENT
Start: 2025-01-24 | End: 2025-01-24 | Stop reason: SDUPTHER

## 2025-01-24 RX ORDER — HYDROCODONE BITARTRATE AND ACETAMINOPHEN 5; 325 MG/1; MG/1
1 TABLET ORAL EVERY 6 HOURS PRN
Qty: 6 TABLET | Refills: 0 | Status: SHIPPED | OUTPATIENT
Start: 2025-01-24 | End: 2025-01-29

## 2025-01-24 RX ORDER — ACETAMINOPHEN 325 MG/1
650 TABLET ORAL ONCE
Status: COMPLETED | OUTPATIENT
Start: 2025-01-24 | End: 2025-01-24

## 2025-01-24 RX ORDER — SODIUM CHLORIDE 0.9 % (FLUSH) 0.9 %
5-40 SYRINGE (ML) INJECTION EVERY 12 HOURS SCHEDULED
Status: DISCONTINUED | OUTPATIENT
Start: 2025-01-24 | End: 2025-01-24 | Stop reason: HOSPADM

## 2025-01-24 RX ORDER — ONDANSETRON 2 MG/ML
INJECTION INTRAMUSCULAR; INTRAVENOUS
Status: DISCONTINUED | OUTPATIENT
Start: 2025-01-24 | End: 2025-01-24 | Stop reason: SDUPTHER

## 2025-01-24 RX ORDER — CEFAZOLIN SODIUM/WATER 2 G/20 ML
2000 SYRINGE (ML) INTRAVENOUS
Status: COMPLETED | OUTPATIENT
Start: 2025-01-24 | End: 2025-01-24

## 2025-01-24 RX ORDER — FENTANYL CITRATE 50 UG/ML
INJECTION, SOLUTION INTRAMUSCULAR; INTRAVENOUS
Status: DISCONTINUED | OUTPATIENT
Start: 2025-01-24 | End: 2025-01-24 | Stop reason: SDUPTHER

## 2025-01-24 RX ORDER — SODIUM CHLORIDE 9 MG/ML
INJECTION, SOLUTION INTRAVENOUS PRN
Status: DISCONTINUED | OUTPATIENT
Start: 2025-01-24 | End: 2025-01-24 | Stop reason: HOSPADM

## 2025-01-24 RX ORDER — SODIUM CHLORIDE 0.9 % (FLUSH) 0.9 %
5-40 SYRINGE (ML) INJECTION PRN
Status: DISCONTINUED | OUTPATIENT
Start: 2025-01-24 | End: 2025-01-24 | Stop reason: HOSPADM

## 2025-01-24 RX ORDER — KETOROLAC TROMETHAMINE 10 MG/1
10 TABLET, FILM COATED ORAL EVERY 6 HOURS PRN
Qty: 14 TABLET | Refills: 0 | Status: SHIPPED | OUTPATIENT
Start: 2025-01-24 | End: 2026-01-24

## 2025-01-24 RX ORDER — SODIUM CHLORIDE 9 MG/ML
INJECTION, SOLUTION INTRAVENOUS CONTINUOUS
Status: DISCONTINUED | OUTPATIENT
Start: 2025-01-24 | End: 2025-01-24 | Stop reason: HOSPADM

## 2025-01-24 RX ORDER — PROPOFOL 10 MG/ML
INJECTION, EMULSION INTRAVENOUS
Status: DISCONTINUED | OUTPATIENT
Start: 2025-01-24 | End: 2025-01-24 | Stop reason: SDUPTHER

## 2025-01-24 RX ORDER — DIMENHYDRINATE 50 MG
50 TABLET ORAL ONCE
Status: COMPLETED | OUTPATIENT
Start: 2025-01-24 | End: 2025-01-24

## 2025-01-24 RX ORDER — SODIUM CHLORIDE, SODIUM LACTATE, POTASSIUM CHLORIDE, CALCIUM CHLORIDE 600; 310; 30; 20 MG/100ML; MG/100ML; MG/100ML; MG/100ML
INJECTION, SOLUTION INTRAVENOUS CONTINUOUS
Status: DISCONTINUED | OUTPATIENT
Start: 2025-01-24 | End: 2025-01-24 | Stop reason: HOSPADM

## 2025-01-24 RX ORDER — LIDOCAINE HYDROCHLORIDE 20 MG/ML
INJECTION, SOLUTION EPIDURAL; INFILTRATION; INTRACAUDAL; PERINEURAL
Status: DISCONTINUED | OUTPATIENT
Start: 2025-01-24 | End: 2025-01-24 | Stop reason: SDUPTHER

## 2025-01-24 RX ADMIN — LIDOCAINE HYDROCHLORIDE 100 MG: 20 INJECTION, SOLUTION EPIDURAL; INFILTRATION; INTRACAUDAL; PERINEURAL at 08:39

## 2025-01-24 RX ADMIN — FENTANYL CITRATE 50 MCG: 50 INJECTION INTRAMUSCULAR; INTRAVENOUS at 08:38

## 2025-01-24 RX ADMIN — PROPOFOL 60 MG: 10 INJECTION, EMULSION INTRAVENOUS at 08:39

## 2025-01-24 RX ADMIN — SODIUM CHLORIDE, POTASSIUM CHLORIDE, SODIUM LACTATE AND CALCIUM CHLORIDE: 600; 310; 30; 20 INJECTION, SOLUTION INTRAVENOUS at 07:02

## 2025-01-24 RX ADMIN — ACETAMINOPHEN 650 MG: 325 TABLET ORAL at 07:02

## 2025-01-24 RX ADMIN — PROPOFOL 50 MG: 10 INJECTION, EMULSION INTRAVENOUS at 08:43

## 2025-01-24 RX ADMIN — DEXAMETHASONE SODIUM PHOSPHATE 4 MG: 4 INJECTION INTRA-ARTICULAR; INTRALESIONAL; INTRAMUSCULAR; INTRAVENOUS; SOFT TISSUE at 08:50

## 2025-01-24 RX ADMIN — PROPOFOL 150 MCG/KG/MIN: 10 INJECTION, EMULSION INTRAVENOUS at 08:40

## 2025-01-24 RX ADMIN — Medication 2000 MG: at 08:32

## 2025-01-24 RX ADMIN — ONDANSETRON 4 MG: 2 INJECTION, SOLUTION INTRAMUSCULAR; INTRAVENOUS at 08:50

## 2025-01-24 RX ADMIN — DIMENHYDRINATE 50 MG: 50 TABLET ORAL at 07:02

## 2025-01-24 ASSESSMENT — PAIN - FUNCTIONAL ASSESSMENT: PAIN_FUNCTIONAL_ASSESSMENT: 0-10

## 2025-01-24 ASSESSMENT — PAIN SCALES - GENERAL
PAINLEVEL_OUTOF10: 0
PAINLEVEL_OUTOF10: 0

## 2025-01-24 ASSESSMENT — LIFESTYLE VARIABLES: SMOKING_STATUS: 0

## 2025-01-24 NOTE — OP NOTE
Preoperative diagnosis: Cystocele    Postoperative diagnosis: Same + vaginal vault prolapse    Procedure: Anterior colporrhaphy    Surgeon: Julita Shaver D.O.    Assistant: Darshan Rodriguez PGY 4    Anesthesia: Gen.    Estimated blood loss: Less than 10 mL    Fluids: Lactated Ringer's    Urine: 150 mL of clear urine    Condition: Stable    Complications: None    Specimen: None    Findings: The patient is status post hysterectomy with a grade 3 cystocele.  During the dissection a permanent suture was noted from the failed colpopexy.    Procedure: The patient was taken to the operating room where she was prepped and draped in the usual sterile fashion in a dorsal lithotomy position.  A weighted speculum was placed in the patient's vagina and the anterior vaginal wall was grasped approximately 2 cm inferior to the urethral meatus with an Allis.  A second Allis was then placed at the [place] of the cystocele.  The area between the 2 Allises was injected with 1% lidocaine in 50-50 blend with injectable saline.  Approximately 20 mL of this were injected to assist with hydrodissection.  A scalpel was then used to incise between 2 Allises.  Sharp and blunt dissection were used to dissect down to the underlying fascia.  The underlying fascia was then reapproximated with interrupted sutures of 2-0 Vicryl.  A purse string of 2-0 Vicryl was used to close the apical aspect.  Excess vaginal mucosa was then trimmed with Metzenbaum scissors.  The vaginal mucosa was then closed with 3-0 Vicryl.  Sponge, lap, needle, and instrument counts were correct ×2.  The patient was taken to the recovery area in apparently stable condition

## 2025-01-24 NOTE — DISCHARGE INSTRUCTIONS
SAME DAY SURGERY DISCHARGE INSTRUCTIONS    1.  Do not drive or operate hazardous machinery for 24 hours.    2.  Do not make important personal or business decisions for 24 hours.    3.  Do not drink alcoholic beverages for 24 hours.    4.  Do not smoke tobacco products for 24 hours.    5.  Eat light foods (Jell-O, soups, etc....) and drink plenty of fluids (water, Sprite, etc...) up to 8 glasses per day, as you can tolerate.    6.  Limit your activities for 24 hours.  Do not engage in heavy work until your surgeon gives you permission.      7.  Patient should not be left alone for 12-24 hours following surgical procedure.    8.  Wash hands before and after incision care.  It is important to practice good personal hygiene during the post op period.    9.  Notify your doctor immediately of any of the following:    Excessive swelling of, or around the wound area.    Redness.    Temperature of 100 degrees (F) or above.    Excessive pain.    Unable to urinate or empty bladder 4-6 hours after surgery.    Excessive bleeding at incision site.      10.  Call your surgeon for any questions regarding your surgery.    POST-OPERATIVE INSTRUCTIONS     Activity as tolerated; may shower and change dressings as needed.    Pain medication to be taken as directed for discomfort.    No sexual relations, douches, tampons, tub baths, swimming in ponds/pools, or hot tubs for 6 weeks or until seen by the doctor for your follow-up appointment.    May have some vaginal drainage for 1-2 weeks, call if excessive.    Call the office at [phone] (Jenniffer)  [phone] (Abdirahman) for an appointment in 2 weeks.

## 2025-01-24 NOTE — ANESTHESIA POSTPROCEDURE EVALUATION
Department of Anesthesiology  Postprocedure Note    Patient: Alaina Weinberg  MRN: [id number]  YOB: 1950  Date of evaluation: 1/24/2025    Procedure Summary       Date: 01/24/25 Room / Location: [address]    Anesthesia Start: 0835 Anesthesia Stop: 0933    Procedure: Vaginal Anterior Repair Diagnosis:       Cystocele, midline      (Cystocele, midline [N81.11])    Surgeons: Julita Pritchett DO Responsible Provider: Sonam Kebede APRN - CRNA    Anesthesia Type: general, TIVA ASA Status: 3            Anesthesia Type: No value filed.    Cinthia Phase I: Cinthia Score: 10    Cinthia Phase II: Cinthia Score: 10    Anesthesia Post Evaluation    Patient location during evaluation: PACU  Patient participation: complete - patient participated  Level of consciousness: awake and alert  Pain score: 0  Airway patency: patent  Nausea & Vomiting: no nausea and no vomiting  Cardiovascular status: blood pressure returned to baseline and hemodynamically stable  Respiratory status: acceptable  Hydration status: euvolemic  Multimodal analgesia pain management approach  Pain management: adequate        No notable events documented.

## 2025-01-24 NOTE — ANESTHESIA PRE PROCEDURE
Department of Anesthesiology  Preprocedure Note       Name:  Alaina Weinberg   Age:  74 y.o.  :  1950                                          MRN:  [id number]         Date:  2025      Surgeon: Surgeon(s):  Julita Pritchett DO    Procedure: Procedure(s):  Vaginal Anterior Repair    Medications prior to admission:   Prior to Admission medications    Medication Sig Start Date End Date Taking? Authorizing Provider   Ginkgo Biloba (GINKOBA PO) Take by mouth   Yes Jono Hilliard MD   NONFORMULARY jv 7 (OTC stool softner)   Yes Jono Hilliard MD   omeprazole (PRILOSEC) 40 MG delayed release capsule daily  20  Yes Jono Hilliard MD   levothyroxine (SYNTHROID) 112 MCG tablet TAKE 1 TABLET BY MOUTH ONCE DAILY WITH WATER ONLY ON AN EMPTY STOMACH. WAIT AT LEAST 1 2 HOUR TO EAT OR TAKE OTHER MEDS. 20  Yes Jono Hilliard MD   lisinopril-hydroCHLOROthiazide (PRINZIDE;ZESTORETIC) 10-12.5 MG per tablet TAKE 1 TABLET BY MOUTH TWICE A DAY 10/24/20  Yes Jono Hilliard MD   lovastatin (MEVACOR) 20 MG tablet TAKE 1 TABLET BY MOUTH EVERY DAY IN THE EVENING 20  Yes Jono Hilliard MD   betamethasone valerate (VALISONE) 0.1 % cream Apply topically  Patient not taking: Reported on 1/15/2025 4/17/24   Jono Hilliard MD   CRANBERRY PO Take by mouth    Jono Hilliard MD   clobetasol (TEMOVATE) 0.05 % cream Apply topically 2 times weekly 24   Julita Pritchett DO   BL GLUCOSAMINE-CHONDROITIN PO Take by mouth    Jono Hilliard MD   Cholecalciferol (VITAMIN D3) 25 MCG TABS Take by mouth    Jono Hilliard MD       Current medications:    Current Facility-Administered Medications   Medication Dose Route Frequency Provider Last Rate Last Admin    lactated ringers infusion   IntraVENous Continuous Paula Lopez PA-C 125 mL/hr at 25 0702 New Bag at 25 0702    sodium chloride flush 0.9 % injection 5-40 mL  5-40 mL

## 2025-01-28 SDOH — ECONOMIC STABILITY: TRANSPORTATION INSECURITY
IN THE PAST 12 MONTHS, HAS THE LACK OF TRANSPORTATION KEPT YOU FROM MEDICAL APPOINTMENTS OR FROM GETTING MEDICATIONS?: NO

## 2025-01-28 SDOH — ECONOMIC STABILITY: FOOD INSECURITY: WITHIN THE PAST 12 MONTHS, YOU WORRIED THAT YOUR FOOD WOULD RUN OUT BEFORE YOU GOT MONEY TO BUY MORE.: NEVER TRUE

## 2025-01-28 SDOH — ECONOMIC STABILITY: FOOD INSECURITY: WITHIN THE PAST 12 MONTHS, THE FOOD YOU BOUGHT JUST DIDN'T LAST AND YOU DIDN'T HAVE MONEY TO GET MORE.: NEVER TRUE

## 2025-01-28 SDOH — ECONOMIC STABILITY: TRANSPORTATION INSECURITY
IN THE PAST 12 MONTHS, HAS LACK OF TRANSPORTATION KEPT YOU FROM MEETINGS, WORK, OR FROM GETTING THINGS NEEDED FOR DAILY LIVING?: NO

## 2025-01-28 SDOH — ECONOMIC STABILITY: INCOME INSECURITY: IN THE LAST 12 MONTHS, WAS THERE A TIME WHEN YOU WERE NOT ABLE TO PAY THE MORTGAGE OR RENT ON TIME?: NO

## 2025-01-28 ASSESSMENT — PATIENT HEALTH QUESTIONNAIRE - PHQ9
2. FEELING DOWN, DEPRESSED OR HOPELESS: NOT AT ALL
SUM OF ALL RESPONSES TO PHQ9 QUESTIONS 1 & 2: 0
SUM OF ALL RESPONSES TO PHQ QUESTIONS 1-9: 0
SUM OF ALL RESPONSES TO PHQ QUESTIONS 1-9: 0
1. LITTLE INTEREST OR PLEASURE IN DOING THINGS: NOT AT ALL
1. LITTLE INTEREST OR PLEASURE IN DOING THINGS: NOT AT ALL
2. FEELING DOWN, DEPRESSED OR HOPELESS: NOT AT ALL
SUM OF ALL RESPONSES TO PHQ QUESTIONS 1-9: 0
SUM OF ALL RESPONSES TO PHQ QUESTIONS 1-9: 0
SUM OF ALL RESPONSES TO PHQ9 QUESTIONS 1 & 2: 0

## 2025-01-29 ENCOUNTER — HOSPITAL ENCOUNTER (OUTPATIENT)
Age: 75
Setting detail: SPECIMEN
Discharge: HOME | End: 2025-01-29

## 2025-01-29 ENCOUNTER — OFFICE VISIT (OUTPATIENT)
Dept: OBGYN CLINIC | Age: 75
End: 2025-01-29
Payer: MEDICARE

## 2025-01-29 VITALS — BODY MASS INDEX: 33.02 KG/M2 | WEIGHT: 186.4 LBS | SYSTOLIC BLOOD PRESSURE: 138 MMHG | DIASTOLIC BLOOD PRESSURE: 86 MMHG

## 2025-01-29 DIAGNOSIS — R30.0 DYSURIA: Primary | ICD-10-CM

## 2025-01-29 DIAGNOSIS — R30.0 DYSURIA: ICD-10-CM

## 2025-01-29 LAB
BILIRUBIN, POC: ABNORMAL
BLOOD URINE, POC: ABNORMAL
CLARITY, POC: ABNORMAL
COLOR, POC: ABNORMAL
GLUCOSE URINE, POC: ABNORMAL MG/DL
KETONES, POC: ABNORMAL MG/DL
LEUKOCYTE EST, POC: ABNORMAL
NITRITE, POC: ABNORMAL
PH, POC: 7
PROTEIN, POC: ABNORMAL MG/DL
SPECIFIC GRAVITY, POC: 1.02
UROBILINOGEN, POC: ABNORMAL MG/DL

## 2025-01-29 PROCEDURE — 1160F RVW MEDS BY RX/DR IN RCRD: CPT | Performed by: NURSE PRACTITIONER

## 2025-01-29 PROCEDURE — 3017F COLORECTAL CA SCREEN DOC REV: CPT | Performed by: NURSE PRACTITIONER

## 2025-01-29 PROCEDURE — 1159F MED LIST DOCD IN RCRD: CPT | Performed by: NURSE PRACTITIONER

## 2025-01-29 PROCEDURE — G8417 CALC BMI ABV UP PARAM F/U: HCPCS | Performed by: NURSE PRACTITIONER

## 2025-01-29 PROCEDURE — 99214 OFFICE O/P EST MOD 30 MIN: CPT | Performed by: NURSE PRACTITIONER

## 2025-01-29 PROCEDURE — G8399 PT W/DXA RESULTS DOCUMENT: HCPCS | Performed by: NURSE PRACTITIONER

## 2025-01-29 PROCEDURE — 81002 URINALYSIS NONAUTO W/O SCOPE: CPT | Performed by: NURSE PRACTITIONER

## 2025-01-29 PROCEDURE — 1090F PRES/ABSN URINE INCON ASSESS: CPT | Performed by: NURSE PRACTITIONER

## 2025-01-29 PROCEDURE — 1123F ACP DISCUSS/DSCN MKR DOCD: CPT | Performed by: NURSE PRACTITIONER

## 2025-01-29 PROCEDURE — 1036F TOBACCO NON-USER: CPT | Performed by: NURSE PRACTITIONER

## 2025-01-29 PROCEDURE — G8427 DOCREV CUR MEDS BY ELIG CLIN: HCPCS | Performed by: NURSE PRACTITIONER

## 2025-01-29 RX ORDER — NITROFURANTOIN 25; 75 MG/1; MG/1
100 CAPSULE ORAL 2 TIMES DAILY
Qty: 14 CAPSULE | Refills: 0 | Status: SHIPPED | OUTPATIENT
Start: 2025-01-29 | End: 2025-02-05

## 2025-01-29 ASSESSMENT — ENCOUNTER SYMPTOMS: RESPIRATORY NEGATIVE: 1

## 2025-01-29 NOTE — PROGRESS NOTES
PROBLEM VISIT     Date of service: 2025    Alaina Weinberg  Is a 74 y.o. female    PT's PCP is: Ras James MD     : 1950                                             Subjective:       No LMP recorded. Patient has had a hysterectomy.   OB History    Para Term  AB Living   3 2       2   SAB IAB Ectopic Molar Multiple Live Births             2      # Outcome Date GA Lbr Héctor/2nd Weight Sex Type Anes PTL Lv   3             2 Para            1 Para                 Social History     Tobacco Use   Smoking Status Former    Current packs/day: 0.00    Types: Cigarettes    Start date: 1971    Quit date:     Years since quittin.1   Smokeless Tobacco Never        Social History     Substance and Sexual Activity   Alcohol Use Not Currently       Allergies: Bactrim [sulfamethoxazole-trimethoprim] and Ciprofloxacin      Current Outpatient Medications:     omeprazole (PRILOSEC) 20 MG delayed release capsule, Take by mouth daily, Disp: , Rfl:     nitrofurantoin, macrocrystal-monohydrate, (MACROBID) 100 MG capsule, Take 1 capsule by mouth 2 times daily for 7 days, Disp: 14 capsule, Rfl: 0    Ginkgo Biloba (GINKOBA PO), Take by mouth, Disp: , Rfl:     ketorolac (TORADOL) 10 MG tablet, Take 1 tablet by mouth every 6 hours as needed for Pain (Patient not taking: Reported on 2025), Disp: 14 tablet, Rfl: 0    HYDROcodone-acetaminophen (NORCO) 5-325 MG per tablet, Take 1 tablet by mouth every 6 hours as needed for Pain for up to 5 days. Intended supply: 5 days. Take lowest dose possible to manage pain Max Daily Amount: 4 tablets (Patient not taking: Reported on 2025), Disp: 6 tablet, Rfl: 0    CRANBERRY PO, Take by mouth, Disp: , Rfl:     clobetasol (TEMOVATE) 0.05 % cream, Apply topically 2 times weekly, Disp: 45 g, Rfl: 2    NONFORMULARY, jv 7 (OTC stool softner), Disp: , Rfl:     BL GLUCOSAMINE-CHONDROITIN PO, Take by mouth, Disp: , Rfl:     levothyroxine (SYNTHROID) 112

## 2025-02-01 LAB
MICROORGANISM SPEC CULT: ABNORMAL
MICROORGANISM SPEC CULT: ABNORMAL
SERVICE CMNT-IMP: ABNORMAL
SPECIMEN DESCRIPTION: ABNORMAL

## 2025-02-03 ENCOUNTER — NURSE ONLY (OUTPATIENT)
Dept: OBGYN CLINIC | Age: 75
End: 2025-02-03
Payer: MEDICARE

## 2025-02-03 DIAGNOSIS — R31.9 URINARY TRACT INFECTION WITH HEMATURIA, SITE UNSPECIFIED: Primary | ICD-10-CM

## 2025-02-03 DIAGNOSIS — N39.0 URINARY TRACT INFECTION WITH HEMATURIA, SITE UNSPECIFIED: Primary | ICD-10-CM

## 2025-02-03 PROCEDURE — 96372 THER/PROPH/DIAG INJ SC/IM: CPT | Performed by: NURSE PRACTITIONER

## 2025-02-03 RX ORDER — LIDOCAINE HYDROCHLORIDE AND EPINEPHRINE BITARTRATE 20; .01 MG/ML; MG/ML
1.7 INJECTION, SOLUTION SUBCUTANEOUS ONCE
Status: COMPLETED | OUTPATIENT
Start: 2025-02-03 | End: 2025-02-03

## 2025-02-03 RX ORDER — CEFTRIAXONE 500 MG/1
500 INJECTION, POWDER, FOR SOLUTION INTRAMUSCULAR; INTRAVENOUS ONCE
Status: COMPLETED | OUTPATIENT
Start: 2025-02-03 | End: 2025-02-03

## 2025-02-03 RX ADMIN — CEFTRIAXONE 500 MG: 500 INJECTION, POWDER, FOR SOLUTION INTRAMUSCULAR; INTRAVENOUS at 13:57

## 2025-02-03 RX ADMIN — LIDOCAINE HYDROCHLORIDE AND EPINEPHRINE BITARTRATE 1.7 ML: 20; .01 INJECTION, SOLUTION SUBCUTANEOUS at 13:56

## 2025-02-06 ENCOUNTER — OFFICE VISIT (OUTPATIENT)
Dept: OBGYN CLINIC | Age: 75
End: 2025-02-06

## 2025-02-06 VITALS — DIASTOLIC BLOOD PRESSURE: 78 MMHG | WEIGHT: 188 LBS | BODY MASS INDEX: 33.3 KG/M2 | SYSTOLIC BLOOD PRESSURE: 138 MMHG

## 2025-02-06 DIAGNOSIS — Z09 POSTOPERATIVE EXAMINATION: Primary | ICD-10-CM

## 2025-02-06 PROCEDURE — 99024 POSTOP FOLLOW-UP VISIT: CPT

## 2025-02-06 NOTE — PROGRESS NOTES
Postop Progress Note    Subjective    Alaina Weinberg presents to the office for postop follow up.  Chief Complaint   Patient presents with    Post-Op Check     Patient had anterior colporrhaphy on 1-. Patient reports that since rocephin inj. On Monday her stool has been a gian color. She has read that this can be normal. Stool is still abnormal color but is improving.      Objective    Vitals:    02/06/25 0838   BP: 138/78     General: alert, cooperative and no distress  Incision: healing well    Assessment  Doing well postoperatively.  She was dx with UTI and was treated with rocephin injection on Monday.  Had some rust color to her stools after injection but this is resolving.  UTI symptoms have improved.  Denies issues with emptying bladder otherwise.  Denies vaginal pain, discharge.  Doing well with restrictions.    Plan  1. Continue any current medications  2. Wound care discussed  3. Pt is to continue with activity restrictions   4. Usual diet  5. Follow up: 4 weeks     Electronically signed by Paula Lopez PA-C on 2/6/2025 at 11:19 AM

## 2025-02-17 ENCOUNTER — TELEPHONE (OUTPATIENT)
Dept: OBGYN CLINIC | Age: 75
End: 2025-02-17

## 2025-02-17 NOTE — TELEPHONE ENCOUNTER
Patient is on my schedule tomorrow for an ongoing issue with discolored stool - this started after rocephin injection and she thought it was a side effect. I think she might need to see PCP or GI for this since it is so far out from surgery and there was no work done on posterior wall.

## 2025-03-12 ENCOUNTER — OFFICE VISIT (OUTPATIENT)
Dept: OBGYN CLINIC | Age: 75
End: 2025-03-12

## 2025-03-12 VITALS
WEIGHT: 188 LBS | SYSTOLIC BLOOD PRESSURE: 118 MMHG | DIASTOLIC BLOOD PRESSURE: 78 MMHG | HEIGHT: 63 IN | BODY MASS INDEX: 33.31 KG/M2

## 2025-03-12 DIAGNOSIS — Z09 POSTOPERATIVE EXAMINATION: Primary | ICD-10-CM

## 2025-03-12 PROCEDURE — 99024 POSTOP FOLLOW-UP VISIT: CPT | Performed by: OBSTETRICS & GYNECOLOGY

## 2025-03-12 RX ORDER — CLOBETASOL PROPIONATE 0.5 MG/G
CREAM TOPICAL
Qty: 45 G | Refills: 2 | Status: SHIPPED | OUTPATIENT
Start: 2025-03-12

## 2025-03-12 RX ORDER — ATORVASTATIN CALCIUM 20 MG/1
TABLET, FILM COATED ORAL
COMMUNITY
Start: 2025-02-17

## 2025-03-12 NOTE — PROGRESS NOTES
Postop Progress Note    Subjective    Alaina Weinberg presents to the office for postop follow up.    Chief Complaint   Patient presents with    Post-Op Check     Patient is being seen for 6 week post op after Anterior colporrhaphy 1/24/25. Patient states that she has been doing well, denies bleeding, discharge, pain.          Objective    Vitals:    03/12/25 0809   BP: 118/78     General: alert, cooperative and no distress  Incision: healing well    Assessment  Doing well postoperatively.    Plan  1. Continue any current medications  2. Wound care discussed  3. Pt is to increase activities as tolerated  4. Usual diet  5. Follow up: as needed    Electronically signed by Julita Shaver DO on 3/12/2025 at 8:31 AM

## (undated) DEVICE — SPONGE,PEANUT,XRAY,ST,SM,3/8",5/CARD: Brand: MEDLINE INDUSTRIES, INC.

## (undated) DEVICE — PREP PAD BNS: Brand: CONVERTORS

## (undated) DEVICE — UNDERPANTS INCONT XL 45-70IN KNIT SEAMLESS DSGN COLOR-CODED

## (undated) DEVICE — GLOVE SURG SZ 65 CRM LTX FREE POLYISOPRENE POLYMER BEAD ANTI

## (undated) DEVICE — DRAPE SURG LITH HYSTSCP D AND C STD N FEN N REINF W FLD PCH

## (undated) DEVICE — SOLUTION IV IRRIG POUR BRL 0.9% SODIUM CHL 2F7124

## (undated) DEVICE — PEN: MARKING STD 100/CS: Brand: MEDICAL ACTION INDUSTRIES

## (undated) DEVICE — TOWEL,OR,DSP,ST,BLUE,STD,4/PK,20PK/CS: Brand: MEDLINE

## (undated) DEVICE — SYRINGE, LUER LOCK, 10ML: Brand: MEDLINE

## (undated) DEVICE — NEEDLE SPNL 20GA L3.5IN YEL HUB S STL REG WALL FIT STYL

## (undated) DEVICE — SOLUTION IV 50ML 0.9% SOD CHL

## (undated) DEVICE — TUBING, SUCTION, 9/32" X 12', STRAIGHT: Brand: MEDLINE INDUSTRIES, INC.

## (undated) DEVICE — PAD N ADH W3XL4IN POLY COT SFT PERF FLM EASILY CUT ABSRB

## (undated) DEVICE — NEEDLE SPNL 20GA L3.5IN YEL HUB S STL REG WALL FIT STYL W/

## (undated) DEVICE — SYRINGE MED 10ML LUERLOCK TIP W/O SFTY DISP

## (undated) DEVICE — PACK,LITHOTOMY: Brand: MEDLINE

## (undated) DEVICE — IV BAG ACCESS SPIKE, CLAVE®, CHECK VALVE: Brand: ICU MEDICAL

## (undated) DEVICE — PAD,ABDOMINAL,8"X10",ST,LF: Brand: MEDLINE

## (undated) DEVICE — Z DISCONTINUED USE 2272114 DRAPE SURG UTIL 26X15 IN W/ TAPE N INVASIVE MULTLYR DISP

## (undated) DEVICE — SOLUTION SURG PREP ANTIMICROBIAL 4 OZ SKIN WND EXIDINE

## (undated) DEVICE — NDLCTR: FOAM/MAG 40CT 64/CS: Brand: MEDICAL ACTION INDUSTRIES

## (undated) DEVICE — SPONGE: SPECIALTY PEANUT XR 100/CS: Brand: MEDICAL ACTION INDUSTRIES

## (undated) DEVICE — INTENDED FOR TISSUE SEPARATION, AND OTHER PROCEDURES THAT REQUIRE A SHARP SURGICAL BLADE TO PUNCTURE OR CUT.: Brand: BARD-PARKER ® CARBON RIB-BACK BLADES

## (undated) DEVICE — CATHETER URETH 16FR L16IN RED RUB INTMIT ROB MOD BARDX

## (undated) DEVICE — SUTURE VCRL + SZ 3-0 L27IN ABSRB UD L26MM SH 1/2 CIR VCP416H

## (undated) DEVICE — DECANTER BAG 9": Brand: MEDLINE INDUSTRIES, INC.

## (undated) DEVICE — Z DISCONTINUED BY MEDLINE USE 2711682 TRAY SKIN PREP DRY W/ PREM GLV

## (undated) DEVICE — YANKAUER,BULB TIP,W/O VENT,RIGID,STERILE: Brand: MEDLINE

## (undated) DEVICE — GLOVE SURG SZ 65 L12IN FNGR THK87MIL WHT LTX FREE

## (undated) DEVICE — SUTURE VCRL + SZ 0 L18IN ABSRB UD L36MM CT-1 1/2 CIR VCP840D

## (undated) DEVICE — SOLUTION IRRIG 1000ML 0.9% SOD CHL USP POUR PLAS BTL

## (undated) DEVICE — INTENDED FOR TISSUE SEPARATION, AND OTHER PROCEDURES THAT REQUIRE A SHARP SURGICAL BLADE TO PUNCTURE OR CUT.: Brand: BARD-PARKER ® STAINLESS STEEL BLADES

## (undated) DEVICE — SUTURE VCRL SZ 2-0 L27IN ABSRB VLT L26MM UR-6 5/8 CIR J602H

## (undated) DEVICE — SUTURE VICRYL + SZ 2-0 L27IN ABSRB VLT UR-6 5/8 CIR TAPR PNT VCP602H

## (undated) DEVICE — COVER LT HNDL BLU PLAS

## (undated) DEVICE — SYRINGE IRRIG 60ML SFT PLIABLE BLB EZ TO GRP 1 HND USE W/

## (undated) DEVICE — TOTAL TRAY, DB, 100% SILI FOLEY, 16FR 10: Brand: MEDLINE

## (undated) DEVICE — MERCY TIFFIN LITHOTOMY: Brand: MEDLINE INDUSTRIES, INC.

## (undated) DEVICE — SUTURE VICRYL + SZ 3-0 L27IN ABSRB UD L26MM SH 1/2 CIR VCP416H

## (undated) DEVICE — GAUZE,SPONGE,4"X4",16PLY,XRAY,STRL,LF: Brand: MEDLINE